# Patient Record
Sex: FEMALE | Race: WHITE | NOT HISPANIC OR LATINO | URBAN - METROPOLITAN AREA
[De-identification: names, ages, dates, MRNs, and addresses within clinical notes are randomized per-mention and may not be internally consistent; named-entity substitution may affect disease eponyms.]

---

## 2024-03-25 ENCOUNTER — OFFICE VISIT (OUTPATIENT)
Dept: OBSTETRICS AND GYNECOLOGY | Facility: CLINIC | Age: 34
End: 2024-03-25
Payer: COMMERCIAL

## 2024-03-25 VITALS
BODY MASS INDEX: 34.16 KG/M2 | SYSTOLIC BLOOD PRESSURE: 100 MMHG | DIASTOLIC BLOOD PRESSURE: 60 MMHG | WEIGHT: 174 LBS | HEIGHT: 60 IN

## 2024-03-25 DIAGNOSIS — Z01.419 WELL WOMAN EXAM WITH ROUTINE GYNECOLOGICAL EXAM: Primary | ICD-10-CM

## 2024-03-25 PROCEDURE — 87624 HPV HI-RISK TYP POOLED RSLT: CPT

## 2024-03-25 PROCEDURE — 88141 CYTOPATH C/V INTERPRET: CPT | Performed by: PATHOLOGY

## 2024-03-25 PROCEDURE — 88175 CYTOPATH C/V AUTO FLUID REDO: CPT

## 2024-03-25 PROCEDURE — 99395 PREV VISIT EST AGE 18-39: CPT | Performed by: OBSTETRICS & GYNECOLOGY

## 2024-03-25 PROCEDURE — 1036F TOBACCO NON-USER: CPT | Performed by: OBSTETRICS & GYNECOLOGY

## 2024-03-25 RX ORDER — ALBUTEROL SULFATE 0.83 MG/ML
SOLUTION RESPIRATORY (INHALATION)
COMMUNITY
Start: 2024-01-20

## 2024-03-25 RX ORDER — SERTRALINE HYDROCHLORIDE 50 MG/1
TABLET, FILM COATED ORAL
COMMUNITY
Start: 2024-03-21

## 2024-03-25 RX ORDER — OMEPRAZOLE 20 MG/1
1 CAPSULE, DELAYED RELEASE ORAL DAILY
COMMUNITY
Start: 2021-06-21

## 2024-03-25 RX ORDER — TIRZEPATIDE 2.5 MG/.5ML
INJECTION, SOLUTION SUBCUTANEOUS
COMMUNITY
Start: 2023-08-12

## 2024-03-25 RX ORDER — ACETAMINOPHEN 500 MG
TABLET ORAL
COMMUNITY

## 2024-03-25 RX ORDER — INSULIN LISPRO 100 [IU]/ML
INJECTION, SOLUTION INTRAVENOUS; SUBCUTANEOUS
COMMUNITY
Start: 2023-10-12

## 2024-03-25 RX ORDER — LEVOTHYROXINE SODIUM 75 UG/1
TABLET ORAL
COMMUNITY

## 2024-03-25 RX ORDER — GLUCAGON INJECTION, SOLUTION 1 MG/.2ML
INJECTION, SOLUTION SUBCUTANEOUS
COMMUNITY
Start: 2023-08-28

## 2024-03-25 RX ORDER — BUDESONIDE AND FORMOTEROL FUMARATE DIHYDRATE 80; 4.5 UG/1; UG/1
AEROSOL RESPIRATORY (INHALATION)
COMMUNITY
Start: 2020-05-18

## 2024-03-25 RX ORDER — MONTELUKAST SODIUM 10 MG/1
10 TABLET ORAL DAILY
COMMUNITY

## 2024-03-25 NOTE — PROGRESS NOTES
Yamel Medina is a 33 y.o. female who is here for a annual exam.     Patient states her cycles were irregular with 7 days of flow but has skipped a month.  Does sometimes skip cycles.  Will keep track of them.    Sexually active: Abstinence    Regular self breast exam: yes  no concerns on her exams    Menstrual History:  OB History          0    Para   0    Term   0       0    AB   0    Living   0         SAB   0    IAB   0    Ectopic   0    Multiple   0    Live Births   0                Patient's last menstrual period was 2024 (approximate).         Review of Systems  All Normal Review of Systems  Constitutional: no fever, no chills, no recent weight gain, no recent weight loss and no fatigue.    Gastrointestinal: no abdominal pain, no constipation, no nausea, no diarrhea and no vomiting.    Genitourinary: no dysuria, no urinary incontinence, no vaginal dryness, no vaginal itching, no dyspareunia, no pelvic pain, no dysmenorrhea, no sexual problems, no change in urinary frequency, no vaginal discharge, no unexplained vaginal bleeding and no lesion/sore.    Breasts: No masses.  No nipple discharge.  No redness    Objective   /60   Ht 1.524 m (5')   Wt 78.9 kg (174 lb)   LMP 2024 (Approximate)   BMI 33.98 kg/m²     OBGyn Exam   Physical Exam  Constitutional: Alert and in no acute distress. Well developed, well nourished.   Head and Face: Head and face: Normal.    Eyes: Normal external exam - nonicteric sclera, extraocular movements intact (EOMI) and no ptosis.   Ears, Nose, Mouth, and Throat: External inspection of ears and nose: Normal.    Neck: No neck asymmetry. Supple. Thyroid not enlarged and there were no palpable thyroid nodules.   Chest: Breasts: Normal appearance, no nipple discharge and no skin changes. Palpation of breasts and axillae: No palpable mass and no axillary lymphadenopathy.   Abdomen: Soft nontender; no abdominal mass palpated. No organomegaly. No hernias.      Genitourinary:   External genitalia: Normal. No inguinal lymphadenopathy. Bartholin's Urethral and Skenes Glands: Normal. Urethra: Normal.    Bladder: Normal on palpation.   Vagina: Normal. No discharge  Cervix: Normal.    Uterus: Normal.    Right Adnexa/parametria: Normal.  Left Adnexa/parametria: Normal.    Inspection of Perianal Area: Normal.     Musculoskeletal: No joint swelling seen, normal movements of all extremities.   Skin: Normal skin color and pigmentation, normal skin turgor, and no rash.   Neurologic: Non-focal. Grossly intact.   Psychiatric: Alert and oriented x 3. Affect normal to patient baseline. Mood: Appropriate.      Assessment/Plan   1) Annual exam:  Normal exam today  Pap with HPV testing completed    Thank you again for your visit to our office today  Please follow-up as needed or in 1 year with your annual exam

## 2024-04-12 ENCOUNTER — TELEPHONE (OUTPATIENT)
Dept: OBSTETRICS AND GYNECOLOGY | Facility: CLINIC | Age: 34
End: 2024-04-12
Payer: COMMERCIAL

## 2024-04-12 NOTE — TELEPHONE ENCOUNTER
Left voicemail to call and set up an appointment for office EMB (Endometrial biopsy).     Radha Lloyd MA

## 2024-04-12 NOTE — TELEPHONE ENCOUNTER
----- Message from Kathleen Daugherty MD sent at 4/11/2024  7:29 AM EDT -----  EMB  ----- Message -----  From: Lab, Background User  Sent: 4/10/2024  10:32 AM EDT  To: Kathleen Daugherty MD

## 2024-05-09 ENCOUNTER — PROCEDURE VISIT (OUTPATIENT)
Dept: OBSTETRICS AND GYNECOLOGY | Facility: CLINIC | Age: 34
End: 2024-05-09
Payer: COMMERCIAL

## 2024-05-09 VITALS
BODY MASS INDEX: 29.16 KG/M2 | SYSTOLIC BLOOD PRESSURE: 110 MMHG | DIASTOLIC BLOOD PRESSURE: 72 MMHG | HEIGHT: 65 IN | WEIGHT: 175 LBS

## 2024-05-09 DIAGNOSIS — Z32.02 ENCOUNTER FOR PREGNANCY TEST WITH RESULT NEGATIVE: ICD-10-CM

## 2024-08-02 ENCOUNTER — APPOINTMENT (OUTPATIENT)
Dept: OBSTETRICS AND GYNECOLOGY | Facility: CLINIC | Age: 34
End: 2024-08-02
Payer: COMMERCIAL

## 2024-08-02 VITALS
SYSTOLIC BLOOD PRESSURE: 126 MMHG | BODY MASS INDEX: 32.98 KG/M2 | DIASTOLIC BLOOD PRESSURE: 60 MMHG | WEIGHT: 168 LBS | HEIGHT: 60 IN

## 2024-08-02 DIAGNOSIS — Z31.69 INFERTILITY COUNSELING: Primary | ICD-10-CM

## 2024-08-02 PROCEDURE — 1036F TOBACCO NON-USER: CPT | Performed by: OBSTETRICS & GYNECOLOGY

## 2024-08-02 PROCEDURE — 99213 OFFICE O/P EST LOW 20 MIN: CPT | Performed by: OBSTETRICS & GYNECOLOGY

## 2024-08-02 PROCEDURE — 3008F BODY MASS INDEX DOCD: CPT | Performed by: OBSTETRICS & GYNECOLOGY

## 2024-08-02 NOTE — PROGRESS NOTES
Subjective   Patient ID: Yamel Medina is a 34 y.o. female who presents for Consult (Surrogacy (Not trying to carry) ).    Patient is a G0    Would like to use a surrogate for fertility  Is single and looking for surrogate as well as donor sperm      ROS  All Normal Review of Systems  Constitutional: no fever, no chills, no recent weight gain, no recent weight loss and no fatigue.    Gastrointestinal: no abdominal pain, no constipation, no nausea, no diarrhea and no vomiting.    Genitourinary: no dysuria, no urinary incontinence, no vaginal dryness, no vaginal itching, no dyspareunia, no pelvic pain, no dysmenorrhea, no sexual problems, no change in urinary frequency, no vaginal discharge, no unexplained vaginal bleeding and no lesion/sore.    Breasts: No masses.  No nipple discharge.  No redness    Objective   /60   Ht 1.524 m (5')   Wt 76.2 kg (168 lb)   LMP 07/31/2024   BMI 32.81 kg/m²        Assessment/Plan   1) looking for fertility assistance with surrogate  Will refer to infertility for process      Thank you for your visit to our office today.

## 2024-12-12 ENCOUNTER — CONSULT (OUTPATIENT)
Dept: ENDOCRINOLOGY | Facility: CLINIC | Age: 34
End: 2024-12-12
Payer: COMMERCIAL

## 2024-12-12 ENCOUNTER — ANCILLARY PROCEDURE (OUTPATIENT)
Dept: ENDOCRINOLOGY | Facility: CLINIC | Age: 34
End: 2024-12-12
Payer: COMMERCIAL

## 2024-12-12 VITALS
DIASTOLIC BLOOD PRESSURE: 90 MMHG | HEIGHT: 60 IN | SYSTOLIC BLOOD PRESSURE: 151 MMHG | HEART RATE: 76 BPM | BODY MASS INDEX: 34.91 KG/M2 | WEIGHT: 177.8 LBS

## 2024-12-12 DIAGNOSIS — Z31.41 FERTILITY TESTING: ICD-10-CM

## 2024-12-12 DIAGNOSIS — R73.03 PRE-DIABETES: Primary | ICD-10-CM

## 2024-12-12 DIAGNOSIS — Z31.69 INFERTILITY COUNSELING: ICD-10-CM

## 2024-12-12 DIAGNOSIS — Z11.3 SCREENING FOR STDS (SEXUALLY TRANSMITTED DISEASES): ICD-10-CM

## 2024-12-12 DIAGNOSIS — Z31.69 PROCREATIVE MANAGEMENT COUNSELING: ICD-10-CM

## 2024-12-12 DIAGNOSIS — Z13.1 SCREENING FOR DIABETES MELLITUS: ICD-10-CM

## 2024-12-12 DIAGNOSIS — Z01.83 ENCOUNTER FOR RH BLOOD TYPING: ICD-10-CM

## 2024-12-12 DIAGNOSIS — E10.9 TYPE 1 DIABETES MELLITUS WITHOUT COMPLICATION: ICD-10-CM

## 2024-12-12 DIAGNOSIS — Z13.29 SCREENING FOR THYROID DISORDER: ICD-10-CM

## 2024-12-12 DIAGNOSIS — Z11.59 ENCOUNTER FOR SCREENING FOR OTHER VIRAL DISEASES: ICD-10-CM

## 2024-12-12 DIAGNOSIS — Z13.71 SCREENING FOR GENETIC DISEASE CARRIER STATUS: ICD-10-CM

## 2024-12-12 LAB
ABO GROUP (TYPE) IN BLOOD: NORMAL
ANTIBODY SCREEN: NORMAL
EST. AVERAGE GLUCOSE BLD GHB EST-MCNC: 174 MG/DL
HBA1C MFR BLD: 7.7 %
HBV SURFACE AG SERPL QL IA: NONREACTIVE
HCV AB SER QL: NONREACTIVE
HIV 1+2 AB+HIV1 P24 AG SERPL QL IA: NONREACTIVE
RH FACTOR (ANTIGEN D): NORMAL
TREPONEMA PALLIDUM IGG+IGM AB [PRESENCE] IN SERUM OR PLASMA BY IMMUNOASSAY: NONREACTIVE
TSH SERPL-ACNC: 2.24 MIU/L (ref 0.44–3.98)

## 2024-12-12 PROCEDURE — 86850 RBC ANTIBODY SCREEN: CPT

## 2024-12-12 PROCEDURE — 87389 HIV-1 AG W/HIV-1&-2 AB AG IA: CPT

## 2024-12-12 PROCEDURE — 86803 HEPATITIS C AB TEST: CPT

## 2024-12-12 PROCEDURE — 99215 OFFICE O/P EST HI 40 MIN: CPT | Performed by: OBSTETRICS & GYNECOLOGY

## 2024-12-12 PROCEDURE — 87491 CHLMYD TRACH DNA AMP PROBE: CPT

## 2024-12-12 PROCEDURE — 99205 OFFICE O/P NEW HI 60 MIN: CPT | Performed by: OBSTETRICS & GYNECOLOGY

## 2024-12-12 PROCEDURE — 84443 ASSAY THYROID STIM HORMONE: CPT

## 2024-12-12 PROCEDURE — 87591 N.GONORRHOEAE DNA AMP PROB: CPT

## 2024-12-12 PROCEDURE — 83516 IMMUNOASSAY NONANTIBODY: CPT

## 2024-12-12 PROCEDURE — 87340 HEPATITIS B SURFACE AG IA: CPT

## 2024-12-12 PROCEDURE — 86900 BLOOD TYPING SEROLOGIC ABO: CPT

## 2024-12-12 PROCEDURE — 86780 TREPONEMA PALLIDUM: CPT

## 2024-12-12 PROCEDURE — 83036 HEMOGLOBIN GLYCOSYLATED A1C: CPT

## 2024-12-12 PROCEDURE — 86901 BLOOD TYPING SEROLOGIC RH(D): CPT

## 2024-12-12 ASSESSMENT — PAIN SCALES - GENERAL: PAINLEVEL_OUTOF10: 0-NO PAIN

## 2024-12-12 NOTE — PROGRESS NOTES
Visit Type: In Person    NEW FERTILITY PATIENT VISIT    Referred by: Dr. Daugherty   Accompanied today by:  Mother       Yamel Medina is a 34 y.o.  female who presents with   Other: Single, desires fertility, would like to consider gestational carrier    Single- no current relationship  Lives on her own- owns house in [place]    Works as Hospice Social Worker    Has multiple medical co-morbidities including DM1, scoliosis with chronic back pain, asthma, depression , hypothyroidism  -Diagnosed with DM1 at age 7 and has been insulin-dependent her whole life     PCOS diagnosed based on irregular menses (now regular on Ozempic) and elevated testosterone/clinical hirsutism    PRIOR EVALUATION / TREATMENT  See below    Hysterosalpingogram: None  Saline Infused Sonography: None    GYN Pelvic Ultrasound:  (ordered for irregular bleeding)  RESULT:     Uterus size: 7.8 x 4.6 x 3.5 cm        -Orientation: Retroverted        -Myometrium: Normal sonographic appearance.        -Endometrial echo complex: 0.6 cm        -Cervix: Nabothian cysts noted within the cervix.     Right ovary: 3.1 x 3.4 x 2.3 cm    Normal sonographic appearance with physiologic follicles.     Left ovary: 3.1 x 3.6 x 2.0 cm    Normal sonographic appearance with physiologic follicles.     Pelvis free fluid: Trace amount of free fluid present within the   posterior cul-de-sac and adjacent to the left ovary.     Other:  CT scan  demonstrated cholecystis, otherwise normal  Prior Labs   Latest Reference Range & Units Most Recent   FOLLICLE STIMULATING HORMONE IU/L 5.5  19 15:50   Hemoglobin A1C 4.3 - 5.6 % 7.8 (H) (E)  24 09:07   LH IU/L 9.9  19 15:50   Thyroid Stimulating Hormone 0.44 - 3.98 mIU/L 1.50  21 15:55   17-Hydroxyprogesterone ng/dL 70  19 15:50   DHEA Sulfate 65 - 395 ug/dL 52 (L)  19 15:50   Testosterone, Free 0.1 - 6.4 pg/mL 8.6 (H)  19 15:50   Estimated Average Glucose mg/dL 177 (E)  24 09:07    Testosterone, Total, LC-MS/MS 2 - 45 ng/dL 75 (H)  19 15:50   (H): Data is abnormally high  (L): Data is abnormally low  (E): External lab result     Relationship Status: single    Have you ever been pregnant? No     OB Hx     OB History          0    Para   0    Term   0       0    AB   0    Living   0         SAB   0    IAB   0    Ectopic   0    Multiple   0    Live Births   0                 GYN HISTORY    History of STD or PID: No  LMP: Patient's last menstrual period was 2024.  Last pap smear:   Lab Results   Component Value Date    FINALINTERP  2024     Squamous and/or Glandular Abnormality    A. THINPREP PAP CERVIX, SCREENING -     Specimen Adequacy  Satisfactory for evaluation; endocervical/transformation zone component is present    General Categorization  Epithelial cell abnormality- glandular cell, see interpretation.    Descriptive Interpretation  Atypical glandular cells             History of abnormal paps: No  History of abnormal mammogram: No  Date of last Mammogram :  N/A  Coitus: None currently    Pain with intercourse, bowel movements or full bladder: No     Pelvic pain: No    MENSTRUAL HISTORY:   Menarche:  14  Contraception:  Has been on OCP but not for about 10 years  Cycle length:  Q 30 days  -periods  now regular on GLP1 agonist  -irregular prior  Bleeding length: 4 days   Flow :  Light   Dysmenorrhea: No     ENDOCRINE HISTORY  Nipple Discharge: No  Vision changes: No  Headaches: No  Excess hair growth: Yes  Acne: Yes  Oily skin:Yes  Recent weight change: No  Significant exercise history: Yes  -1 hour daily  History of eating disorder: No    PMH  No past medical history on file.  DM1- has also developed insulin resistance- on Ozempic and Humalog  Asthma- Albuterol  Hypothryoidism- on synthroid  Scoliosis  Depression- Zoloft    MEDICATIONS    Current Outpatient Medications on File Prior to Visit   Medication Sig Dispense Refill    albuterol 2.5 mg /3 mL (0.083  %) nebulizer solution USE 3 ML VIA NEBULIZER THREE TIMES DAILY AS NEEDED. INHALE OVER 5-15 MINUTES      budesonide-formoteroL (Symbicort) 80-4.5 mcg/actuation inhaler Inhale.      cholecalciferol (Vitamin D3) 50 mcg (2,000 unit) capsule Take by mouth.      Gvoke HypoPen 2-Pack 1 mg/0.2 mL auto-injector       HumaLOG U-100 Insulin 100 unit/mL injection INJECT 120 UNITS UNDER THE SKIN VIA PUMP AS DIRECTED      INSULIN LISPRO SUBQ Inject under the skin.      levothyroxine (Synthroid, Levoxyl) 75 mcg tablet Take by mouth.      montelukast (Singulair) 10 mg tablet Take 1 tablet (10 mg) by mouth once daily.      omeprazole (PriLOSEC) 20 mg DR capsule Take 1 capsule (20 mg) by mouth once daily.      sertraline (Zoloft) 50 mg tablet        No current facility-administered medications on file prior to visit.       PSH  No past surgical history on file.   C cholecystectomy 2023    PSYCH HISTORY  Past psych history: Yes Depression  Prior hospitalization for mental health disorder: No     SOCIAL HISTORY  Occupation: Hospice social work  Social History     Tobacco Use    Smoking status: Never    Smokeless tobacco: Never   Vaping Use    Vaping status: Never Used   Substance Use Topics    Alcohol use: Yes     Comment: Rare    Drug use: Never     History of incarceration: No  History of domestic violence: No  History of  incest or rape: No     PARTNER HISTORY    Partner: No     FAMILY HISTORY   Family History   Problem Relation Name Age of Onset    No Known Problems Mother      No Known Problems Father     -Crohn's and IBS in the family     Family History of Blood Clots: No  Family history of breast, ovary, colon, endometrial cancer: Yes - Pancreatic cancer (maternal grandfather)    GENETIC HISTORY  Ethnic background patient: [employer]  Ethnic background partner: N/A  Genetic Disease in Family: No  Birth Defects in Family: No  Genetic screening performed previously: No     BMI:   BMI Readings from Last 1 Encounters:    24 34.72 kg/m²     VITALS:  /90   Pulse 76   Ht 1.524 m (5')   Wt 80.6 kg (177 lb 12.8 oz)   LMP 2024   BMI 34.72 kg/m²   LMP: Patient's last menstrual period was 2024.    ASSESSMENT   34 y.o.  female single, desires fertility, does not want to carry pregnancy due to multiple medical co-morbidites. Suspected oligoovulation (currently regulated on Ozempic) and the following pertinent medical issues: DM1, scoliosis with chronic back pain, asthma, depression , hypothyroidism .  Partner SA: NA    COUNSELING    We discussed the options for donor sperm, including using a directed vs. unidentified donor; we discussed the pros and cons of each option.  We discussed that for unidentified donors that offspring may be able to identify the donor and/or other offspring from the same donor in the future. We discussed the costs and logistics of obtaining the sperm and the use of a sperm bank.  We discussed options for conceiving including natural cycle IUI, medicated IUI, and IVF.  We discussed natural fecundity and the need for fertility testing.  We discussed the Fertility Center policy to obtain donor clearance prior to ordering and shipping sperm. Donor sperm must be on site prior to treatment cycle start.    Discussed that if patient desires gestational carrier, would need further assessment to determine qualifications for our gestational carrier program.  This fertility option would require IVF.  Discussed IVF as option and briefly reviewed what IVF involves (i.e. use of fertility drugs, egg retrieval, embryo transfer) and discussed IVF is treatment as well as diagnostic testing.      Routine Testing  Fertility Center  STDs Within 1 year   Genetic carrier Waiver/Completed   T&S Within 1 year   AMH Within 1 year   TSH Within 1 year   Rubella/Varicella Within 5 years     BMI Testing  Fertility Center  CBC Within 1 year   CMP Within 1 year   HgbA1c Within 1 year   Mag, Phos, Vit D <18  Within 1 year   MFM > 40  REQ   Wt loss consult > 40 OPT     PLAN  Orders Placed This Encounter   Procedures    US pelvis transvaginal    Antimullerian Hormone (Amh)    TSH with reflex to Free T4 if abnormal    Type And Screen    Hepatitis B surface antigen    Hepatitis C Antibody    HIV-1 and HIV-2 antibodies    Syphilis Screen with Reflex    C. Trachomatis / N. Gonorrhoeae, Amplified Detection    Hemoglobin A1C    Referral to Maternal Fetal Medicine    Referral to Ob-Gyn Behavioral Health       GENETIC SCREENING PATIENT  Will send patient to genetic counselor to review screening options    PARTNER  N/A    FOLLOW UP   Consults: MFM consult: indication:Discuss risks of pregnancy, Genetic consult: indicationgenetic screening options, Behavior Medicine consult, and Financial consult  Chart to primary nurse for care coordination and patient check list/education  Enroll in Engaged MD  Take prenatal vitamins, vitamin D 2000 IUs daily  Discussed that pap and mammogram must be updated per ACOG guidelines before treatment can begin  Discussed that treatment cannot proceed until checklist items are complete   6 week follow up with MD- FOR IVF consult (3rd party)  Additional testing for BMI < 18 or > 40: NA  Sperm Donor:  Information given on facilities    MD Completion:  Ectopic Risk: No  Medically Complex: Yes See above     Fertility Plan Update:  Testing as above  Referrals as above- 3rd party psych, MFM, and genetics  3. Will begin to look at donor sperm options  4. Follow up with me for IVF consult- donor sperm and future gestational carrier     Abbi Clay  12/12/2024  10:47 AM

## 2024-12-12 NOTE — PATIENT INSTRUCTIONS
DONOR SPERM FACILITIES*     For your convenience, below is a list of commonly used donor sperm facilities by patients at  Fertility Center.   The facilities listed below adhere to CLIA guidelines and are FDA approved for .     [facility] ([facility])   [facility])   [facility]   [facility]   [facility]   [facility]   [facility]     If the facility you wish to use is not listed, you may request a review by the  and third party manager. Approval is not guaranteed and is granted on a case-by-case basis.   The donor of your choice will be reviewed by our third party team. Upon approval, please confirm with your clinical team before shipping. Samples and/or donors that have not been approved may not be accepted.        * Please note that [facility] has no affiliation or agency relationship with any of the donor facilities listed above, and it disclaims any and all responsibility for tissue or other property transferred from such storage facilities.     Gestational carrier  [facility]      Ash Medina,    We have placed a referral for you to see maternal fetal medicine as a part of your next steps moving forward in your fertility treatment. You can call [phone] to schedule your appointment.     Your  Fertility Team     Please call our genetic counselors to schedule a visit to discuss your genetic carrier screening options:  [phone]       ASSESSMENT   34 y.o.  female single, desires fertility, does not want to carry pregnancy due to multiple medical co-morbidites. Suspected oligoovulation (currently regulated on Ozempic) and the following pertinent medical issues: DM1, scoliosis with chronic back pain, asthma, depression , hypothyroidism .  Partner SA: NA    COUNSELING    We discussed the options for donor sperm,  including using a directed vs. unidentified donor; we discussed the pros and cons of each option.  We discussed that for unidentified donors that offspring may be able to identify the donor and/or other offspring from the same donor in the future. We discussed the costs and logistics of obtaining the sperm and the use of a sperm bank.  We discussed options for conceiving including natural cycle IUI, medicated IUI, and IVF.  We discussed natural fecundity and the need for fertility testing.  We discussed the Fertility Center policy to obtain donor clearance prior to ordering and shipping sperm. Donor sperm must be on site prior to treatment cycle start.    Discussed that if patient desires gestational carrier, would need further assessment to determine qualifications for our gestational carrier program.  This fertility option would require IVF.  Discussed IVF as option and briefly reviewed what IVF involves (i.e. use of fertility drugs, egg retrieval, embryo transfer) and discussed IVF is treatment as well as diagnostic testing.      Routine Testing  Fertility Center  STDs Within 1 year   Genetic carrier Waiver/Completed   T&S Within 1 year   AMH Within 1 year   TSH Within 1 year   Rubella/Varicella Within 5 years     BMI Testing [facility]  CBC Within 1 year   CMP Within 1 year   HgbA1c Within 1 year   Mag, Phos, Vit D <18 Within 1 year   MFM > 40  REQ   Wt loss consult > 40 OPT     PLAN  Orders Placed This Encounter   Procedures    US pelvis transvaginal    Antimullerian Hormone (Amh)    TSH with reflex to Free T4 if abnormal    Type And Screen    Hepatitis B surface antigen    Hepatitis C Antibody    HIV-1 and HIV-2 antibodies    Syphilis Screen with Reflex    C. Trachomatis / N. Gonorrhoeae, Amplified Detection    Hemoglobin A1C    Referral to Maternal Fetal Medicine    Referral to Ob-Gyn Behavioral Health       GENETIC SCREENING PATIENT  Will send patient to genetic counselor to review screening  options    PARTNER  N/A    FOLLOW UP   Consults: MFM consult: indication:Discuss risks of pregnancy, Genetic consult: indicationgenetic screening options, Behavior Medicine consult, and Financial consult  Chart to primary nurse for care coordination and patient check list/education  Enroll in Engaged MD  Take prenatal vitamins, vitamin D 2000 IUs daily  Discussed that pap and mammogram must be updated per ACOG guidelines before treatment can begin  Discussed that treatment cannot proceed until checklist items are complete   6 week follow up with MD- FOR IVF consult (3rd party)  Additional testing for BMI < 18 or > 40: NA  Sperm Donor:  Information given on facilities    MD Completion:  Ectopic Risk: No  Medically Complex: Yes See above     Fertility Plan Update:  Testing as above  Referrals as above- 3rd party psych, MFM, and genetics  3. Will begin to look at donor sperm options  4. Follow up with me for IVF consult- donor sperm and future gestational carrier     Abbi Clay  12/12/2024  10:47 AM

## 2024-12-13 LAB
C TRACH RRNA SPEC QL NAA+PROBE: NEGATIVE
N GONORRHOEA DNA SPEC QL PROBE+SIG AMP: NEGATIVE

## 2024-12-15 LAB — MIS SERPL-MCNC: 13.01 NG/ML (ref 0.18–11.71)

## 2024-12-16 ENCOUNTER — ANCILLARY PROCEDURE (OUTPATIENT)
Dept: ENDOCRINOLOGY | Facility: CLINIC | Age: 34
End: 2024-12-16
Payer: COMMERCIAL

## 2024-12-16 DIAGNOSIS — Z31.41 FERTILITY TESTING: ICD-10-CM

## 2024-12-16 PROCEDURE — 76830 TRANSVAGINAL US NON-OB: CPT

## 2024-12-16 PROCEDURE — 76830 TRANSVAGINAL US NON-OB: CPT | Performed by: OBSTETRICS & GYNECOLOGY

## 2024-12-18 DIAGNOSIS — Z31.83 ENCOUNTER FOR ASSISTED REPRODUCTIVE FERTILITY CYCLE: ICD-10-CM

## 2024-12-18 NOTE — PROGRESS NOTES
"Boarding Pass Intended Parent for Future Gestational Carrier (IPGC) Checklist    Age: 35 y.o.    Provider: Abbi Clay MD  Primary RN: Rosanne Rivera  Reasons for Treatment: Indication for Use of Gestational Carrier multiple medical co-morbidities including DM1, scoliosis with chronic back pain, asthma, depression , hypothyroidism  -Diagnosed with DM1 at age 7 and has been insulin-dependent her whole life     Gestational Carrier: None  Last BMI  25 : 32.67 kg/m²       Past Medical History:   Diagnosis Date    Asthma     Depression     Diabetes mellitus type 1 (Multi)     Hypothyroidism     Polycystic ovary syndrome     Scoliosis      **recommend FUV prior to FET    Date Done Consultation Results/Comments   24 Medication Protocol Lead in: OCP  Fertility Plan Update: Antagonist , HMG 75  Trigger plan: Lupron  Pre-retrieval meds: Antibiotics per protocol  Adjuncts: none  Notes: freezing for GC (will likely carry herself but still want to make GC eligible)    Stop Ozempic with IVF stim start, can restart after retrieval and stop again prior to FET.    24 IVF Consult IVF Consult: Yes  PGT-A/M? Yes PGT-A -- order to be sent by [facility] once on 24 Consult r/t 3rd party Yes   25  Waiver (Out) Form Enroll in EngagedMD: Yes (Rosanne Rivera RN)  Received and in chart: Yes (Rosanne Rivera RN)   25 IVF Information and Authorization (to be completed annually) Received and in chart: Yes (Rosanne Rivera RN)   25 [employer] Packet [x]     7/10/25 [facility] Donor Sperm Packet (ID REQ) (Each time sperm is ordered) [x]     7/10/25 [employer] Gamete/Tissue- Single (ADULT) Packet- Patient (Each time sperm is ordered) [x]     25 Procedure Order Placed  [x]     1/23/24  3/5/25 Psych Consult Okay to proceed? Yes  (Will need future group visit with GC)    Legal For future carrier   3/20/25 Genetics Consult Okay to proceed? Yes    \"Top 3 options for anonymous sperm donor " "were previously reviewed at genetic counseling appointment on 03/20/2025:  [facility] donor [id number] (445-gene [facility] carrier screen was negative). CMV negative.  Donor was screened for congenital adrenal hyperplasia and was negative (personalized residual risk not included on test report but expected to be <1%).   Due to risk-reducing carrier screening, this donor is APPROVED.  DISPOSITION:  Yamel Medina expressed understanding of the above information. Donors [id number] ...from [facility] are approved from a genetic standpoint. Patient is aware that she can send additional donor profiles if she would like other donors approved.  Hodan Kirby, [facility] at 4/11/2025  2:36 PM     *elected to use donor [id number]    Patient POS carrier for Congenital adrenal hyperplasia      Other Saw MFM (Dr. Whiting)    \"In summary, I see no overt contraindications to assisted reproduction but would recommend aggressive attempt at glycemic control prior to carrying a pregnancy with goal A1c< 7%, ideally < 6.5%. \"    Reviewed Scoliosis  \"Reviewed that scoliosis can generally be managed with supportive care and non-invasive treatments in pregnancy like massage, PT, acupuncture. \"       Date Done Female Labs Results/Comments    [facility] Will do viromeds at baseline, baseline STD panel done 12/12/24 WNL   12/12/2024 T&S (Q 1 Year) ABO: B  Rh: POS  Antibody: NEG   12/12/2024 AMH 13.010 (H)   12/12/2024 TSH 2.24 (Ref range: 0.44 - 3.98 mIU/L)   2025 Carrier Screening Myriad 2bP: ordered through genetics, 113 gene panel  Pos Congenital adrenal hyperplasia     3/23/2023  3/27/2025 Pap Smear Lab Results   Component Value Date    FINALINTERP  03/27/2025         A. THINPREP PAP CERVIX, SCREENING -     Specimen Adequacy  Satisfactory for evaluation; endocervical/transformation zone component is present    General Categorization  Negative for intraepithelial lesion or malignancy.    Descriptive Interpretation  Negative for " intraepithelial lesion or malignancy            [id number]  03/23/2023     A.  THINPREP PAP CERVICAL:              Specimen adequacy:       SATISFACTORY FOR EVALUATION.       Quality Indicator: Endocervical/transformation zone component is present.              General Categorization:       NEGATIVE FOR INTRAEPITHELIAL LESION OR MALIGNANCY.                            HIGH RISK HPV TEST RESULT:                       HPV GENOTYPE  16                      NEGATIVE       HPV GENOTYPE  18                      NEGATIVE       HPV GENOTYPE  OTHER             NEGATIVE              Reference Range: Negative                     HPV: Negative   N/A Mammogram N/A   5/30/25 Hysteroscopy Findings: endometrial polyp(s) noted near left cornua and bilateral ostia well visualized, otherwise normal appearance of cavity.     -s/p hysteroscopy polypectomy 5/30/2025 2025 FDA Workup FDA Physical: completed; Date: 6/26/25 (within 6 mo)  FDA Questionnaire: completed; Date: 7/10/25 (within 6 mo)  Viromeds: to be completed at baseline  Summary of Records: to be signed off once viromeds are resulted after baseline    Donor Number Add to spreadsheet [id number]  Egg Bank: N/A  Agency: future carrier   Date Done Male Labs (skip if using sperm donor)   Results/Comments  DONOR SPERM      Donor Type Unidentifiable   2025 Donor Cleared? [x]     [facility] donor [id number]     CMV STATUS: NEGATIVE  Genetic clearance as above    Known Donor FDA Eligible: N/A  FDA Waiver: N/A    [facility]        MD Completion:  Ectopic Risk: No  Medically Complex: Yes    Reviewed and okay to proceed  Abbi Clay 07/14/25 11:45 AM

## 2025-01-23 ENCOUNTER — APPOINTMENT (OUTPATIENT)
Dept: BEHAVIORAL HEALTH | Facility: CLINIC | Age: 35
End: 2025-01-23
Payer: COMMERCIAL

## 2025-01-23 DIAGNOSIS — E28.2 PCOS (POLYCYSTIC OVARIAN SYNDROME): ICD-10-CM

## 2025-01-23 DIAGNOSIS — F54 PSYCHOLOGICAL FACTORS AFFECTING MEDICAL CONDITION: Primary | ICD-10-CM

## 2025-01-23 DIAGNOSIS — Z31.69 INFERTILITY COUNSELING: ICD-10-CM

## 2025-01-23 PROCEDURE — 90791 PSYCH DIAGNOSTIC EVALUATION: CPT | Performed by: PSYCHOLOGIST

## 2025-02-04 ENCOUNTER — CONSULT (OUTPATIENT)
Dept: ENDOCRINOLOGY | Facility: CLINIC | Age: 35
End: 2025-02-04
Payer: COMMERCIAL

## 2025-02-04 VITALS
DIASTOLIC BLOOD PRESSURE: 96 MMHG | BODY MASS INDEX: 34.89 KG/M2 | TEMPERATURE: 98.2 F | OXYGEN SATURATION: 99 % | HEIGHT: 60 IN | RESPIRATION RATE: 17 BRPM | SYSTOLIC BLOOD PRESSURE: 138 MMHG | WEIGHT: 177.7 LBS

## 2025-02-04 DIAGNOSIS — Z31.69 PROCREATIVE MANAGEMENT COUNSELING: Primary | ICD-10-CM

## 2025-02-04 DIAGNOSIS — E28.2 PCOS (POLYCYSTIC OVARIAN SYNDROME): ICD-10-CM

## 2025-02-04 PROBLEM — E03.9 HYPOTHYROIDISM: Status: ACTIVE | Noted: 2025-02-04

## 2025-02-04 PROBLEM — J45.909 ASTHMA: Status: ACTIVE | Noted: 2025-02-04

## 2025-02-04 PROBLEM — F32.A DEPRESSION: Status: ACTIVE | Noted: 2025-02-04

## 2025-02-04 PROBLEM — R73.03 PRE-DIABETES: Status: ACTIVE | Noted: 2025-02-04

## 2025-02-04 PROBLEM — M41.9 SCOLIOSIS: Status: ACTIVE | Noted: 2025-02-04

## 2025-02-04 PROBLEM — E10.9 DIABETES MELLITUS TYPE 1 (MULTI): Status: ACTIVE | Noted: 2025-02-04

## 2025-02-04 PROCEDURE — 99215 OFFICE O/P EST HI 40 MIN: CPT | Performed by: OBSTETRICS & GYNECOLOGY

## 2025-02-04 PROCEDURE — 99417 PROLNG OP E/M EACH 15 MIN: CPT | Performed by: OBSTETRICS & GYNECOLOGY

## 2025-02-04 RX ORDER — AZELASTINE 1 MG/ML
1 SPRAY, METERED NASAL 2 TIMES DAILY
COMMUNITY
Start: 2024-12-18

## 2025-02-04 ASSESSMENT — COLUMBIA-SUICIDE SEVERITY RATING SCALE - C-SSRS
2. HAVE YOU ACTUALLY HAD ANY THOUGHTS OF KILLING YOURSELF?: NO
6. HAVE YOU EVER DONE ANYTHING, STARTED TO DO ANYTHING, OR PREPARED TO DO ANYTHING TO END YOUR LIFE?: NO
1. IN THE PAST MONTH, HAVE YOU WISHED YOU WERE DEAD OR WISHED YOU COULD GO TO SLEEP AND NOT WAKE UP?: NO

## 2025-02-04 ASSESSMENT — PAIN SCALES - GENERAL: PAINLEVEL_OUTOF10: 0-NO PAIN

## 2025-02-04 ASSESSMENT — PATIENT HEALTH QUESTIONNAIRE - PHQ9
SUM OF ALL RESPONSES TO PHQ9 QUESTIONS 1 AND 2: 0
1. LITTLE INTEREST OR PLEASURE IN DOING THINGS: NOT AT ALL
2. FEELING DOWN, DEPRESSED OR HOPELESS: NOT AT ALL

## 2025-02-04 ASSESSMENT — LIFESTYLE VARIABLES: CURRENT_SMOKER: NO

## 2025-02-04 NOTE — PATIENT INSTRUCTIONS
Please discuss the following with your endocrinologist tomorrow:  Risks of pregnancy with Type 1 DM and elevated HgA1C- please ask him to document this  Ozempic and insulin plan for stimulation and egg retrieval with MAC anesthesia (propofol, no intubation), including periods of NPO- please ask  him to document this    Third party- : Dee Aleman,    Listed below are the guillory or agencies we work with [facility]:    [facility] Donor [facility]gencies  [facility]  Donor  (matching system)  [facility]       Thank you,  Your Fertility Center Team      ASSESSMENT    34 y.o.  desires to proceed with IVF Freeze All for Future Gestational Carrier.     Discussed use of Gestational carrier for pregnancy. Discussed family/friend vs Agency Carrier. Discussed ethical, legal, and financial considerations. Discussed timeline and logistics of selecting a GC, FET process, and Pregnancy considerations. Discussed need for independent . Briefly discussed state law r/t GC pregnancies. Discussed need for medical insurance policy for GC pregnancy and delivery. Discussed need for psychology consult with  psychologist prior to creation of embryos and again with identified GC. Discussed FDA guidance for Eligibility Determination for Donors of Human Cells, Tissues, and Cellular and Tissue-Based Products. Discussed that testing is required prior to cryopreservation of gametes and/or creation of embryos. Discussed that if embryos are determined to be ineligible future gestational carrier would need to sign FDA waiver where they are counseled on communicable disease risk.     We reviewed IVF and discussed the following:    In-vitro fertilization and embryo transfer  Stimulation Protocols  Oocyte retrieval, risks   Cryopreservation  Assessment of fertilization   Embryo cleavage  Statistics  ICSI/ Assisted hatching  Embryo transfer and preparation for future GC  Risks of OHSS and  multiple gestation  Dropped cycles  Use of birth control  Selective reduction  Number of embryos to transfer  Ectopic pregnancy  Team of physicians  Informed consent procedure  Folic acid supplementation  Genetic screening  PGT-A/PGT-M  Gestational Carrier Agencies  Need for Independent   Psychology Consult (3rd party)    ART Cycle Plan    1. Protocol/Fertility Plan Update:   Lead in: OCP  Stimulation protocol: Antagonist /Menopur 75  Trigger plan: Lupron  Pre-retrieval meds: Antibiotics per protocol  Adjuncts: None  Notes:     2. Insemination:  Sperm source: donor  Sperm collection method: Frozen  Notes:  ICSI: Yes  # of oocytes to be fertilized: all    3. Cryopreservation plan  PGT: PGTA   Freeze all? Yes  Oocyte cryopreservation: No    4. Number of embryos to replace to Gestational Carrier: 1  Stage of embryo transfer: day 5    5. Patient willing to accept blood transfusion: No    6. RN to review chart, initiate IP using Gestational Carrier boarding pass, and assure completion of the following prior to proceeding with IVF stimulation:       Orders Placed This Encounter   Procedures    Referral to Genetics    Referral to Ob-Gyn Behavioral Health       Baseline set of STI screening for patient and partner to be completed within the past year. If not, obtain at time of consult.   FDA Viromed screening: For oocyte source completed within 30 days of oocyte retrieval. For sperm source completed within 7 days of sperm collection.  FDA physical exam: ensure completed within 6 months of donation for oocyte and sperm source.   FDA questionnaire: ensure completed within 6 months of donation or repeated if donation since last completion for oocyte and sperm source.  Genetic carrier testing: waiver or carrier screen completed with clearance documentation by provider for both patient and partner (z13.71)  Type & Screen to be completed within the last year (z01.83)  AMH to be completed within the last year  (z31.41)  Frozen sperm sample: ensure frozen IP sample (z31.41) or verify donor sperm on site prior to stimulation start date.  Verify in EMR or obtain copy of patient’s last mammogram (if applicable) and pap smear results for provider review in boarding pass.  Enroll in Engaged MD and complete annual consent forms for IP using GC consent and cryotransport agreements.  Consults: Third Party Nursing, Financial Consult,  and Third Party Psychology Consult: ensure completed prior to IVF baseline  Additional consults MFM consult, Genetics consult, Behavior Medicine consult, Financial consult, and Specialist clearance Endocrinology- recommendations prior to transfer and review what is in the boarding pass.  Legal contracts to be completed prior to FET of gestational carrier.    Fertility Plan Update:  Recommend MFM consult so patient can make final decision about carrying pregnancy (this is optional if patient feels strongly she cannot carry pregnancy)  Will need psych and genetics consults as above, FDA screening  -Patient reports she had visit with Zack (no notes available), will  need follow up with 3rd party psych visit to discuss donor sperm and gestational carrier  -Recommend she work with endocrinologist on plan during stim/retrieval.  We typically recommend holding Ozempic at start of stim; however, she may want to stay on until retrieval and follow procedure protocol (NPO prior to retrieval, etc)    Intimate Exam Performed: No, an intimate exam was not performed at this encounter.   Was FDA Physical Completed Today? No    Abbi Clay  02/04/2025  9:44 AM

## 2025-02-04 NOTE — PROGRESS NOTES
Visit Type: In Person  N/A    IP using Gestational Carrier     Patient is a 34 y.o.  female with a history of single, desires pregnancy, presenting today to discuss IVF using future gestational carrier.    Here today with: Family member  Indication for IVF: Polycystic Ovarian Syndrome and Indication for Use of Gestational Carrier Medical co-morbidities (DM1, scoliosis with chronic back pain, asthma, depression, hypothyroidism)  -Diagnosed with DM1 at age 7 and has been insulin-dependent her whole life , recent HgA1C 7.7%  -Also with PCOS- PCOS diagnosed based on irregular menses (now regular on Ozempic) and elevated testosterone/clinical hirsutism     AMH: 13.010 (H)  Status of fallopian tubes: Not tested   Past Infertility Treatments: None     Latest Reference Range & Units Most Recent   FOLLICLE STIMULATING HORMONE IU/L 5.5  19 15:50   Hemoglobin A1C See comment % 7.7 (H)  24 11:58   Estimated Average Glucose Not Established mg/dL 174  24 11:58   LH IU/L 9.9  19 15:50   Thyroid Stimulating Hormone 0.44 - 3.98 mIU/L 2.24  24 11:58   17-Hydroxyprogesterone ng/dL 70  19 15:50   DHEA Sulfate 65 - 395 ug/dL 52 (L)  19 15:50   Anti-Mullerian Hormone 0.176 - 11.705 ng/mL 13.010 (H)  24 11:58   Testosterone, Total, LC-MS/MS 2 - 45 ng/dL 75 (H)  19 15:50   Testosterone, Free 0.1 - 6.4 pg/mL 8.6 (H)  19 15:50   (H): Data is abnormally high  (L): Data is abnormally low    GYN HISTORY     STDs: None  HX of abnormal Pap: No  HX of abnormal Mammo: No  LMP: Patient's last menstrual period was 2025 (approximate).  Menstrual cycles: Irregular- now regular on Ozempic  Pap smear: 2024  neg  HPV: Negative    Mammogram:  None     PMH  DM1- has also developed insulin resistance- on Ozempic and Humalog  Asthma- Albuterol  Hypothryoidism- on synthroid 75 mcg  Scoliosis  Depression- Zoloft      History of any clotting: No  History of hospitalizations or surgeries:  Yes  History of easy bleeding/bruising: No    PSH  LSC cholecystectomy 2023     Genetic screening Hx  Patient: Jordin 2bP: None  Sperm Source: Donor  Sperm: Myriad 2bP: Not yet selected  Sperm Source Genetic Carrier Screening:  Not done yet  Not done yet    Social history  Occupation:  Hospice social work   Current smoker: No  Social History     Tobacco Use    Smoking status: Never    Smokeless tobacco: Never   Vaping Use    Vaping status: Never Used   Substance Use Topics    Alcohol use: Yes     Comment: Rare    Drug use: Never       Family history  Cognitive deficits: No  Birth defects: No  Other:     Current Meds  Current Outpatient Medications on File Prior to Visit   Medication Sig Dispense Refill    azelastine (Astelin) 137 mcg (0.1 %) nasal spray Administer 1 spray into each nostril 2 times a day.      albuterol 2.5 mg /3 mL (0.083 %) nebulizer solution USE 3 ML VIA NEBULIZER THREE TIMES DAILY AS NEEDED. INHALE OVER 5-15 MINUTES      budesonide-formoteroL (Symbicort) 80-4.5 mcg/actuation inhaler Inhale.      cholecalciferol (Vitamin D3) 50 mcg (2,000 unit) capsule Take by mouth.      Gvoke HypoPen 2-Pack 1 mg/0.2 mL auto-injector       HumaLOG U-100 Insulin 100 unit/mL injection INJECT 120 UNITS UNDER THE SKIN VIA PUMP AS DIRECTED      INSULIN LISPRO SUBQ Inject under the skin.      levothyroxine (Synthroid, Levoxyl) 75 mcg tablet Take by mouth.      montelukast (Singulair) 10 mg tablet Take 1 tablet (10 mg) by mouth once daily.      omeprazole (PriLOSEC) 20 mg DR capsule Take 1 capsule (20 mg) by mouth once daily.      sertraline (Zoloft) 50 mg tablet        No current facility-administered medications on file prior to visit.       Allergies  Sodium citrate    Partner History  N/A    VITALS:  BP (!) 138/96   Temp 36.8 °C (98.2 °F) (Oral)   Resp 17   Ht 1.524 m (5')   Wt 80.6 kg (177 lb 11.2 oz)   LMP 01/14/2025 (Approximate)   SpO2 99%   BMI 34.70 kg/m²   BMI:   BMI Readings from Last 1 Encounters:    25 34.70 kg/m²     BMI Testing  [facility]ertility Center  CBC Within 1 year   CMP Within 1 year   HgbA1c Within 1 year   Mag, Phos, Vit D <18 Within 1 year   MFM > 40  REQ   Wt loss consult > 40 OPT     ASSESSMENT    34 y.o.  desires to proceed with IVF Freeze All for Future Gestational Carrier.     Discussed use of Gestational carrier for pregnancy. Discussed family/friend vs Agency Carrier. Discussed ethical, legal, and financial considerations. Discussed timeline and logistics of selecting a GC, FET process, and Pregnancy considerations. Discussed need for independent . Briefly discussed state law r/t GC pregnancies. Discussed need for medical insurance policy for GC pregnancy and delivery. Discussed need for psychology consult with  psychologist prior to creation of embryos and again with identified GC. Discussed FDA guidance for Eligibility Determination for Donors of Human Cells, Tissues, and Cellular and Tissue-Based Products. Discussed that testing is required prior to cryopreservation of gametes and/or creation of embryos. Discussed that if embryos are determined to be ineligible future gestational carrier would need to sign FDA waiver where they are counseled on communicable disease risk.     We reviewed IVF and discussed the following:    In-vitro fertilization and embryo transfer  Stimulation Protocols  Oocyte retrieval, risks   Cryopreservation  Assessment of fertilization   Embryo cleavage  Statistics  ICSI/ Assisted hatching  Embryo transfer and preparation for future GC  Risks of OHSS and multiple gestation  Dropped cycles  Use of birth control  Selective reduction  Number of embryos to transfer  Ectopic pregnancy  Team of physicians  Informed consent procedure  Folic acid supplementation  Genetic screening  PGT-A/PGT-M  Gestational Carrier Agencies  Need for Independent   Psychology Consult (3rd party)    ART Cycle Plan    1. Protocol/Fertility Plan Update:    Lead in: OCP  Stimulation protocol: Antagonist /Menopur 75  Trigger plan: Lupron  Pre-retrieval meds: Antibiotics per protocol  Adjuncts:  None  Notes:     2. Insemination:  Sperm source: donor  Sperm collection method: Frozen  Notes:  ICSI: Yes  # of oocytes to be fertilized: all    3. Cryopreservation plan  PGT: PGTA   Freeze all? Yes  Oocyte cryopreservation: No    4. Number of embryos to replace to Gestational Carrier: 1  Stage of embryo transfer: day 5    5. Patient willing to accept blood transfusion: No    6. RN to review chart, initiate IP using Gestational Carrier boarding pass, and assure completion of the following prior to proceeding with IVF stimulation:       Orders Placed This Encounter   Procedures    Referral to Genetics    Referral to Ob-Gyn Behavioral Health       Baseline set of STI screening for patient and partner to be completed within the past year. If not, obtain at time of consult.   FDA Viromed screening: For oocyte source completed within 30 days of oocyte retrieval. For sperm source completed within 7 days of sperm collection.  FDA physical exam: ensure completed within 6 months of donation for oocyte and sperm source.   FDA questionnaire: ensure completed within 6 months of donation or repeated if donation since last completion for oocyte and sperm source.  Genetic carrier testing: waiver or carrier screen completed with clearance documentation by provider for both patient and partner (z13.71)  Type & Screen to be completed within the last year (z01.83)  AMH to be completed within the last year (z31.41)  Frozen sperm sample: ensure frozen IP sample (z31.41) or verify donor sperm on site prior to stimulation start date.  Verify in EMR or obtain copy of patient’s last mammogram (if applicable) and pap smear results for provider review in boarding pass.  Enroll in Engaged MD and complete annual consent forms for IP using GC consent and cryotransport agreements.  Consults: Third  Party Nursing, Financial Consult,  and Third Party Psychology Consult: ensure completed prior to IVF baseline  Additional consults MFM consult, Genetics consult, Behavior Medicine consult, Financial consult, and Specialist clearance Endocrinology- recommendations prior to transfer  and review what is in the boarding pass.  Legal contracts to be completed prior to FET of gestational carrier.    Fertility Plan Update:  Recommend MFM consult so patient can make final decision about carrying pregnancy (this is optional if patient feels strongly she cannot carry pregnancy)  Will need psych and genetics consults as above, FDA screening  -Patient reports she had visit with Zack (no notes available), will  need follow up with 3rd party psych visit to discuss donor sperm and gestational carrier  -Recommend she work with endocrinologist on plan during stim/retrieval.  We typically recommend holding Ozempic at start of stim; however, she may want to stay on until retrieval and follow procedure protocol (NPO prior to retrieval, etc)    Intimate Exam Performed: No, an intimate exam was not performed at this encounter.   Was FDA Physical Completed Today? No    Abbi Clay  02/04/2025  9:44 AM

## 2025-02-13 PROBLEM — F54 PSYCHOLOGICAL FACTORS AFFECTING MEDICAL CONDITION: Status: ACTIVE | Noted: 2025-02-13

## 2025-02-13 NOTE — PROGRESS NOTES
Outpatient Psychology Initial Evaluation    IDENTIFYING AND REFERRAL INFORMATION:  Yamel Medina is a 34 y.o. able-bodied, employed single White or  female patient referred by Dr. Clay for Psych Fertility Consult, a standard pre-treatment consult focused on education, info gathering, and discussion of ethics considering autonomy of any future child conceived with third party involvement (gametes, GC).     Start Time: 1:30 pm  End Time: 2:30 pm  Time Spent with Patient: 54 minutes    Session number 1 with this psychologist.    This is a virtual video visit.    Telephone/Televideo Informed Consent for Psychotherapy was reviewed with the patient. Purpose of the consult was reviewed with patient as well. Understanding and verbal agreement was attested to by the patient.     SECURE NOTE:  This document may not be released or reproduced in any form without the consent of either the Provider, the Provider´s  or the Chair of the Department of Psychiatry. This prohibition includes copying the document into any non-Restricted area of the Ambulatory Electronic Medical Record.      SOCIAL AND FAMILY HISTORY:  B/R in [facility]. Raised by her parents. One older sister living in [place] and going through fertility care with her partner in that state. Described her family as very close. She eats several meals a week with her parents and they have a weekly Sunday brunch. Extended family in the area as well. She said she's been open about pursuing fertility care with her family; shared details with her immediate family and her cousins because she has family members who've been through fertility treatment and some of her relatives are in the medical field. She has two best friends, a close friend group. Some of her friends have older children. Her close friends know she is going through fertility care as well.     HISTORY OF MENTAL HEALTH SYMPTOMS AND TREATMENT:  Any history of mental health treatment?  Reported history of situational depression several years ago. Prescribed Zoloft for years, maintains on it via PCP for last 2+ years.   Any history of trauma or abuse in your background? No  Any history of substance use problems? No    FERTILITY CARE:   Why pursue single parenthood?  Part of a long line of independent women, some of whom have gone through fertility care as singles as well. Decided in high school that she should have a GC if she has a child, because of her diabetes, scoliosis, and other health problems. Her great uncle is an orthopedic surgeon and agreed pregnancy would likely be painful, per patient.     When did you decide to have a child? Or children? Do you have a number of kids you want to have?  Plan was for age 36 to start fertility care, with political climate she decided to start now, at age 34.   Wants to have a healthy child, possibly consider a second child in the future based off her experience going through this process.     Who is planning to carry this intended pregnancy? Gestational carrier; still identifying.  Why? Reasons noted above.     Let's discuss the essential goals of family making. Goal #1 is to pass along your genes, Goal #2 is to be pregnant, and Goal #3 is to parent. How important are each of these goals to you? What is the meaning behind the decisions you've made so far?    She Wants to be a parent first and foremost. She reported she has worked through knowledge the other two goals may not be possible and accepts that. She's been aware of it for years. Even willing to do adoption if none of the fertility care options are possible.     Gamete donor decision making:   Planning on donor sperm.   Wants to also use her own egg, but that is still uncertain. She is focusing on realistic expectations.   Wants to do anonymous for all donor materials. Has list of sperm guillory and looking into options; has not picked a place. She wants a donor who is phenotypically  similar.      OBJECTIVE:  Mental Status:  Orientation and consciousness: [x ]oriented X 3 and attentive                                   [ ]other, explain:       Appearance/grooming (factors observable via video):            [x ]appropriate  [ ]poor grooming/hygiene bizarre appearance            [ ]other, explain:        Behavior: [x ]appropriate to context                 [ ]inappropriate and/or bizarre, explain:        Speech: [x]normal rate/rhythm   [ ]pressured speech   []slow                [ ]other,        Language: [x ]normal                  [ ]abnormalities noted, explain:        Mood: [ ]euthymic [ x]depressed [ ]dysphoric [ ]anxious      [ ]euphoric  [ ] other       Affect: [x]mood congruent      []restricted               []incongruent, explain:        Evidence of perceptual disturbances (hallucinations, illusions, etc.):                [ x]no                [ ]yes, explain:       Thought processes:                     [x ]normal and congruent                     [ ]other, explain:        Insight: [ ]good  [x ]adequate [ ]poor []questionable       Judgment: [ ]good  [x ]adequate [ ]poor []questionable       Fund of Knowledge:[ ]above average  [x ]average  [ ]below average  Suicidal/Homicidal assessment: No lethality issues noted or observed. Patient denied suicidal or homicidal ideation, intent, or plan. No history of suicidality.       THERAPEUTIC INTERVENTION:   Psychosocial/Info Gathering and Psychoeducation      INITIAL IMPRESSIONS:  Yamel Medina presented today via video telehealth for a psychosocial assessment. She was competent and able to give informed consent for fertility care. Since she is using more than one form of third party processes/materials for fertility care, there was additional information to review. We did not cover it all today and she should schedule a second appointment to finish the consult.     Diagnosis:   1. Infertility counseling  Referral to Ob-Gyn Behavioral Health            Plan:   rtc to complete consult with this psychologist via video telehealth. Patient agreed to appointment frequency and plan. Patient has scheduling contact info to use as needed.     Ms. Medina should also plan to have an appointment with her and the identified gestational carrier, as indicated by policy, once the GC is identified.         Dario Flores, PhD

## 2025-02-18 NOTE — PROGRESS NOTES
Yamel Medina is a 34 y.o. G0 presenting for an MFM preconception [facility] due to type 1 diabetes. She is without complaints today. ROS is negative.      Issues:   Type 1 diabetes- diagnosed at age 7. Recent A1c 7.7%. Managed with Humalog via [id number] in automode and ozempic. Sees Dr. Clark at [facility] for endo care. Normal creatinine and P:C. History of one hospitalization and two episodes of DKA, one at diagnosis and another during illness.   Class II obesity  Scoliosis with chronic lower back pain  Considering gestational carrier- was told her scoliosis meant she shouldn't carry a pregnancy.     OBHx: nullipara   GynHx: PCOS  MedHx: hypothyroidism, asthma, scoliosis, lower back pain, T1DM, depression  SurgHx: lap luis  Meds: humalog, synthroid, zoloft, omeprazole, montelukast, ozempic  All: NKDA  FamHx: recurrent pregnancy loss   SocHx: no toxic habits    O: Visit Vitals  /81   Wt 80.7 kg (178 lb)   LMP 2025 (Approximate)   BMI 34.76 kg/m²   OB Status Having periods   Smoking Status Never   BSA 1.85 m²      Physical exam deferred for this consultative visit    A/P: 35yo G0 presenting for a preconception consultation. We discussed the following:   Type 1 Diabetes  We discussed the pregnancy implications of the diagnosis of T1DM including increased risk of pre-eclampsia, LGA/macrosomia, shoulder dystocia, stillbirth as well as  hypoglycemia, hyperbilirubinemia and respiratory distress.  We also discussed the risks of miscarriage and increased risk of congenital malformations associated with hyperglycemia in the 1st trimester.  Regarding congenital malformations, cardiac defects are most commonly seen as well as renal, central nervous system, spinal and gastrointestinal anomalies and the risk is directly correlated with HgbA1c level.   We reviewed the importance of glycemic control and its impact on lowering these risks and that we  recommend a HgbA1c <6.0-6.5 prior to conception although a HgbA1c <7.0 is also acceptable.  We discussed management including dietary changes and changes in insulin requirements throughout pregnancy. We discussed blood sugar recommendations in pregnancy are traditionally fasting <95 and 1 hr postprandial values <140 with goal A1c <6.0.  We also reviewed the data from the CONCEPTT trial and that when using a CGM TIR goals are  with target for 70% TIR, <25%ile TAR, <4% low and <1% very low and that improvement in TIR of 5% is associated with improved  outcomes.      We discussed the role of hybrid closed loop systems in pregnancy and that they're not officially approved for FDA use.  I reviewed that there are some limited studies with their use that appear overall reassuring which has mirrored our programs experience, however there are limitations of these systems in that target BG, particularly fasting, are above the target range for pregnant individuals.  However, if the use of a closed loop system results in increased global TIR/A1c then there may be a benefit in use and it is reasonable to continue if tighter control in manual mode is unlikely to be achieved.     We discussed the increased risk of DKA including euglycemia DKA. Discussed our recommendation for serial growth ultrasounds and starting  testing at 32 weeks.  We also discussed starting a bASA for pre-eclampsia prevention at 10-12 weeks gestation.  - Baseline A1c 7.7%  - She is hypoglycemic aware  - Her last eye exam was 10/2024  - Her last foot exam was with primary endo 2024  - Her current insulin regimen is: humalog via MiniMed 780G, ozempic (reviewed need to stop with pregnancy and manufacturers warning to stop 2 months before conception)       2. Class II obesity  Today we discussed the pregnancy implications including increased risk of pre-eclampsia, gestational diabetes,  delivery, LGA/macrosomia, growth  restriction, stillbirth, shoulder dystocia, venous thromboembolic disease and congenital anomalies.  We reviewed that modest weight loss of even 5% of total body weight is associated with a decrease in these risks.  We also discussed that preconception BMI appears to more predictive of adverse pregnancy outcome than gestational weight gain and that it is unclear what is optimal weight gain in pregnancy in women with obesity, though weight loss is discouraged during pregnancy.  We reviewed the [place] [employer] recommendations for gestational weight gain obese women, though subsequent large studies have suggestive that lower weight gain may be associated with lower rates of maternal and fetal morbidity and that we recommend that she limit her weight gain to no more than 11-20lbs during pregnancy and that weight gain less than 11-20 lbs is also acceptable assuming normal fetal growth.  To address some of these concerns we discussed increased pregnancy surveillance to include an early screen for diabetes, detailed anatomic evaluation of the fetus and serial growth ultrasound to assess fetal weight.  We discussed the role of a healthy diet with consideration for referral to a nutritionist as well as exercise.      3. Scoliosis  Reviewed that scoliosis can generally be managed with supportive care and non-invasive treatments in pregnancy like massage, PT, acupuncture.     In summary, I see no overt contraindications to assisted reproduction but would recommend aggressive attempt at glycemic control prior to carrying a pregnancy with goal A1c< 7%, ideally < 6.5%.     Madonna Whiting MD  Maternal Fetal Medicine  Director of Fetal Intervention

## 2025-02-19 ENCOUNTER — INITIAL PRENATAL (OUTPATIENT)
Dept: MATERNAL FETAL MEDICINE | Facility: CLINIC | Age: 35
End: 2025-02-19
Payer: COMMERCIAL

## 2025-02-19 VITALS — WEIGHT: 178 LBS | DIASTOLIC BLOOD PRESSURE: 81 MMHG | SYSTOLIC BLOOD PRESSURE: 117 MMHG | BODY MASS INDEX: 34.76 KG/M2

## 2025-02-19 DIAGNOSIS — E10.9 TYPE 1 DIABETES MELLITUS WITHOUT COMPLICATION: ICD-10-CM

## 2025-02-19 DIAGNOSIS — Z31.69 ENCOUNTER FOR PRECONCEPTION CONSULTATION: Primary | ICD-10-CM

## 2025-02-19 DIAGNOSIS — M41.124 ADOLESCENT IDIOPATHIC SCOLIOSIS OF THORACIC REGION: ICD-10-CM

## 2025-02-19 DIAGNOSIS — E03.9 HYPOTHYROIDISM, UNSPECIFIED TYPE: ICD-10-CM

## 2025-02-19 PROCEDURE — 99215 OFFICE O/P EST HI 40 MIN: CPT | Performed by: OBSTETRICS & GYNECOLOGY

## 2025-03-05 ENCOUNTER — APPOINTMENT (OUTPATIENT)
Dept: BEHAVIORAL HEALTH | Facility: CLINIC | Age: 35
End: 2025-03-05
Payer: COMMERCIAL

## 2025-03-05 DIAGNOSIS — Z31.69 PROCREATIVE MANAGEMENT COUNSELING: ICD-10-CM

## 2025-03-05 DIAGNOSIS — F54 PSYCHOLOGICAL FACTORS AFFECTING MEDICAL CONDITION: Primary | ICD-10-CM

## 2025-03-05 DIAGNOSIS — E28.2 PCOS (POLYCYSTIC OVARIAN SYNDROME): ICD-10-CM

## 2025-03-05 PROCEDURE — 90834 PSYTX W PT 45 MINUTES: CPT | Performed by: PSYCHOLOGIST

## 2025-03-05 NOTE — PROGRESS NOTES
Outpatient Psychology Consult Note      IDENTIFYING AND REFERRAL INFORMATION:  Yamel Medina is a 34 y.o. able-bodied, employed single White or  female patient referred by Dr. Clay for Psych Fertility Consult, a standard pre-treatment consult focused on education, info gathering, and discussion of ethics considerations with third party involvement (gametes, GC). She has updated her intended care plan, which prompted a second appointment.      Start Time: 1:30 pm  End Time: 2:30 pm  Time Spent with Patient: 38 minutes  Time Spent in Documentation/Prep: 20 minutes    Session number 2 with this psychologist.      This is a virtual video visit.    Telephone/Televideo Informed Consent for Psychotherapy and limits of confidentiality were reviewed with the patient as needed. The purpose of the meeting was reviewed as indicated. Patient stated an understanding of the information and agreed to the session.      Session Content:  Yamel stated that after meeting with several specialists including MFM and endocrinology she changed her mind about a gestational carrier. She took the info they gave her, talked with her family (many of whom have gone through fertility care), and made pros and cons lists to feel assured she is making a well thought-out decision. She wants to try carrying a pregnancy herself. She plans to do IVF, so she has prepared embryos if she is unable to carry a pregnancy and does ultimately have to go through a gestational carrier. She is confident in her decision, and is hopeful about the possibility of carrying a pregnancy. She does want to be pregnant, but never thought it was medically possible. Her goals of family making have also shifted, now that she recognizes pregnancy could be possible for her. Being a parent remains #1, but experiencing a pregnancy is now her #2 goal.     She shared that she is comfortable making decisions about termination of a future pregnancy if her life is in  danger and she already knows what she will do if additional embryos remain after she is done with family making. She wants to donate them, either to her sister or for research. Her sister is going through IVF and they have no healthy embryos to date, per patient.       OBJECTIVE:  Mental Status:  Orientation and consciousness: [x ]oriented X 3 and attentive   Appearance/grooming (factors observable via video):            [x ]appropriate    Behavior: [x ]appropriate to context  Speech: [x]normal rate/rhythm    Language: [x ]normal   Mood: [ x]euthymic  Affect: [x]mood congruent     Evidence of perceptual disturbances (hallucinations, illusions, etc.):                [ x]no   Thought processes:                     [x ]normal and congruent   Insight: [ x]good  [ ]adequate [ ]poor []questionable   Judgment: [ x]good  [ ]adequate [ ]poor []questionable   Fund of Knowledge (not formally assessed):[ ]above average  [x ]average  [ ]below average  Risk assessment: No lethality issues noted or observed. No SI/HI, no past or present abuse/trauma. No history of suicidality.         THERAPEUTIC INTERVENTION:   Psychosocial/Info Gathering and Psychoeducation        INITIAL IMPRESSIONS:  Yamel Medina is competent and able to give informed consent for IUI/IVF with her as the intended person carrying the pregnancy.         Diagnosis:   1. Infertility counseling  Referral to [facility]             Plan:   rtc to complete consult with this psychologist via video telehealth. Patient agreed to appointment frequency and plan. Patient has scheduling contact info to use as needed.         Dario Flores, PhD

## 2025-03-20 ENCOUNTER — ALLIED HEALTH (OUTPATIENT)
Dept: GENETICS | Facility: CLINIC | Age: 35
End: 2025-03-20
Payer: COMMERCIAL

## 2025-03-20 VITALS
BODY MASS INDEX: 35.5 KG/M2 | HEART RATE: 79 BPM | OXYGEN SATURATION: 100 % | HEIGHT: 60 IN | SYSTOLIC BLOOD PRESSURE: 124 MMHG | WEIGHT: 180.8 LBS | DIASTOLIC BLOOD PRESSURE: 84 MMHG

## 2025-03-20 DIAGNOSIS — Z31.430 ENCOUNTER OF FEMALE FOR TESTING FOR GENETIC DISEASE CARRIER STATUS FOR PROCREATIVE MANAGEMENT: ICD-10-CM

## 2025-03-20 DIAGNOSIS — Z31.69 PROCREATIVE MANAGEMENT COUNSELING: ICD-10-CM

## 2025-03-20 DIAGNOSIS — Z31.5 ENCOUNTER FOR PROCREATIVE GENETIC COUNSELING: ICD-10-CM

## 2025-03-20 PROCEDURE — 96041 GENETIC COUNSELING SVC EA 30: CPT

## 2025-03-20 ASSESSMENT — PATIENT HEALTH QUESTIONNAIRE - PHQ9
SUM OF ALL RESPONSES TO PHQ9 QUESTIONS 1 AND 2: 0
2. FEELING DOWN, DEPRESSED OR HOPELESS: NOT AT ALL
1. LITTLE INTEREST OR PLEASURE IN DOING THINGS: NOT AT ALL

## 2025-03-20 ASSESSMENT — COLUMBIA-SUICIDE SEVERITY RATING SCALE - C-SSRS
1. IN THE PAST MONTH, HAVE YOU WISHED YOU WERE DEAD OR WISHED YOU COULD GO TO SLEEP AND NOT WAKE UP?: NO
6. HAVE YOU EVER DONE ANYTHING, STARTED TO DO ANYTHING, OR PREPARED TO DO ANYTHING TO END YOUR LIFE?: NO
2. HAVE YOU ACTUALLY HAD ANY THOUGHTS OF KILLING YOURSELF?: NO

## 2025-03-20 ASSESSMENT — PAIN SCALES - GENERAL: PAINLEVEL_OUTOF10: 0-NO PAIN

## 2025-03-20 NOTE — PROGRESS NOTES
"Thank you for the referral of Ms. Yamel Medina. she is a 34 y.o.  female who was seen for genetic counseling due to desired use of a gamete donor in future assisted reproductive technology cycles.    PAST HISTORY:   Patient saw Dr. Madonna Whiting MD ([facility]) for a preconception consult due to the following concerns:  \"Type 1 diabetes- diagnosed at age 7. Recent A1c 7.7%. Managed with Humalog via [id number] in automode and ozempic. Sees Dr. Clark at [facility] for endo care. Normal creatinine and P:C. History of one hospitalization and two episodes of DKA, one at diagnosis and another during illness.   Class II obesity  Scoliosis with chronic lower back pain  Considering gestational carrier- was told her scoliosis meant she shouldn't carry a pregnancy...  In summary, I see no overt contraindications to assisted reproduction but would recommend aggressive attempt at glycemic control prior to carrying a pregnancy with goal A1c< 7%, ideally < 6.5%.\"    Patient also reports that she has a personal history of asthma, hypothyroid, PCOS, and learning delays.   Of note, patient's mother had an amniocentesis when she was pregnant with the patient, which was reportedly normal.    Patient plans to use anonymous sperm donor for conception. Her top 3 options are currently:  [place] [employer] donor [id number] (261-gene Sema4 carrier screening identified that he is a carrier of Coy disease). CMV positive.  [place] [employer] donor  (445-gene [facility] carrier screen was negative). CMV negative.  [place] [employer] donor [id number] (502-gene Sema4 carrier screening identified that he is a carrier of Hermansky-Pudlak Syndrome, Type 6 (HPS6 gene)). CMV negative    FAMILY HISTORY  Medical and family histories were reviewed and the following concerns were apparent:  Patient:  -Sister (living, 39) has Crohn's disease and POTS. She is undergoing fertility workup and recently found out that she is a carrier of congenital adrenal " hyperplasia.   -Mother (living, 69) had learning concerns in school. She also had issues with hyperparathyroidism that is controlled with medication, and has hypothyroid.   -Maternal grandfather (, 69)  of pancreatic cancer; he reportedly had associated work exposures.   ---Maternal great uncle (brother of maternal grandfather, , 80s)  of brain cancer. Maternal first cousin once removed (son of this great uncle, ) also  of brain cancer.   ---Maternal great grandmother (mother of maternal grandfather, ) had many miscarriages and a baby that passed away very shortly after birth.   -Father (living, 73) has scoliosis and thyroid issues.    The remainder of the family history was negative for birth defects, intellectual disability, recurrent pregnancy loss, or recognized inherited conditions. Consanguinity denied.    REPORTED ETHNICITY/RACE:  Ashkenazi Buddhism     COUNSELING:    The following information was discussed with your patient:    Per updated ACOG recommendations from 2017, we discussed the availability, benefits, and limitations of carrier screening. If both gamete contributors are found to be carriers for the same condition, there may be a 25% chance for an affected child and prenatal diagnosis would be available. Negative carrier screening does not rule out the possibility of being a carrier. [place] screening is available for CF, hemoglobinopathies, and thalassemias, and SMA.   In accordance with the most recent carrier screening guidelines from ACMG published in , we also discussed the availability of more expanded carrier screening for additional, primarily autosomal recessive, and some X-linked conditions. Current ACMG guidelines recommend all pregnant patients and those planning a pregnancy should be offered Tier 3 carrier screening. Tier 3 carrier screening includes conditions with a carrier frequency >=1/200 in any ethnic group with reasonable  representation in the [place]. Larger panels that include conditions less common than those in Tier 3 are also available, and are considered Tier 4 screening. There are different carrier screening panels available ranging from 3 of the most common genetic risk factors, to ~175 primarily autosomal recessive/X-linked conditions, to 600+ disorders. Range of clinical features that may be associated with conditions screened for were reviewed. Approximately 2-4% of biological parents are found to be at risk to have a child with a genetic disorder based on this screening. In rare cases, expanded carrier screening results may have health implications for the tested individual.    When utilizing an anonymous donor for conception, intended parents must use an approved bank for donor selection after review of donor profiles. Per [facility] Policy any donor must be approved prior to sperm being purchased and utilized for conception. Included in the donor profile(s) is any genetic screening that the donor may have had. The most common form of screening is expanded carrier screening, in which the donor is screened for a number of autosomal recessive and/or X-linked genetic conditions and determined to be a carrier or unlikely to be a carrier of each disorder tested. The specific carrier testing panel (number and types of diseases included) is variable from donor to donor, even when donors are from the same bank. As noted above, if both gamete sources are carriers of the same autosomal recessive condition, there would be a 25% chance for each conception to be affected with the disorder. In this event, selecting an alternative donor, or performing preimplantation genetic testing for the disorder of concern may be considered.   It is typically recommended by [facility] for the intended biological parent to have expanded carrier screening utilizing a comprehensive panel that includes testing for the majority of conditions recommended  by ACOG and ACMG prior to reviewing donor profiles, so that the intended parents are aware of any common diseases they may be a carrier of, and they can attempt to select a donor that has had negative screening for said disease(s). In addition, patients may have more flexibility in selecting a donor that is a carrier of a common disease if they are already aware that they have screened negative for said disease.    If a donor is a carrier of a condition that the patient has not been screened for, additional screening can be performed on the intended parent if desired; however, if the patient is a carrier of a condition that the potential donor has not been screened for, additional screening for the donor cannot be guaranteed. If further testing is not performed, the couple can select an alternate donor, or they can proceed with the selected donor once they have been informed of the potential risk of an affected child in the absence of additional carrier screening. Carriers generally do not have symptoms.   If the patient does not want to proceed with carrier screening at this time, carrier screening can be declined altogether, or it can be deferred and testing can be performed in the context of screening for any diseases that may be of concern for any potential donor.   The types of genes that are analyzed on carrier screening can cause conditions with a range in severity, clinical symptoms, age of onset, management, and whether treatment is available. In addition, the number of conditions and specific conditions tested for on any given panel is not standardized, and genetic screening ordered for gamete donors is not consistent across guillory. They are often not screened for other types of hereditary disease, such as predisposition to cancers, heart diseases, neurological problems, etc. They sometimes have screening of their chromosomes, which can assist in understanding risks of chromosome conditions in a conception  from that donor. Any genetic testing that is or is not completed through a bank can be discussed with a genetic counselor once a donor is selected.     Carrier screening does not assess for risk of hereditary cancer syndromes, hereditary cardiac syndromes, late-onset conditions, etc.  If patient proceeds with carrier screening, once results are issued and other recommendations by their NIDA provider are completed, NIDA team provides clearance to begin searching for a donor. If patient's carrier screening is negative, they would need to ensure that they have screened negative for any disease(s) that a selected donor may have screened positive for. If carrier screening is positive, they would additionally need to review that the donor has screened negative for any disease the patient has tested positive for. We recommend additional genetic counseling appointment to review the patient and donor's test results for clearance.  Since patient shared 3 donors that she is potentially interested in using, we discussed proceeding with carrier screening based on the ACMG guidelines, with inclusion of the ATP7B gene and the HPS6 gene, as these conditions are carried by potential donors. We will also be certain to screen for congenital adrenal hyperplasia, as her sister is a carrier of this condition. We briefly discussed features of these conditions. When screening through [facility], we discussed optional reflex to additional genes in the future if needed with the same sample and same billing practices.   Additional family history concerns:  The general population risk of type I diabetes is about 1 in 300. The offspring of an isolated case, however, have a 1 in 25 chance of developing type I diabetes.   Multifactorial inheritance. We reviewed that conditions such as diabetes, thyroid disease, Crohn's disease, learning concerns, and many forms of late-onset cancer, are often considered multifactorial, meaning that they are due to some  combination of genetic and environmental risk factors. While we do not have specific testing to assess exact risk, we know that relatives of affected individuals have an elevated risk when compared to the general population of being similarly affected. Yamel should inform her doctors, as well as any future pediatricians, of this family history to allow for early intervention and to maximize outcomes. Carrier screening does not assess risk for these conditions, nor is there a different kind of genetic test that is recommended for any of these conditions.   When an individual is diagnosed with pancreatic cancer at any age, they meet the National Comprehensive Cancer Network (NCCN) guidelines for testing of cancer predisposition genes. First and second degree relatives are also eligible for this testing to determine if there is a genetic predisposition or reason for cancer diagnoses in a family. The patient may be at increased risk of having a mutation if her family member does. We specifically discussed risk of a BRCA1/BRCA2 mutation given Ashkenazi Zoroastrian ancestry and the family history of pancreatic cancer. [facility] accepts self-referrals to the Cancer Genetics clinic at  by calling [phone].   We briefly reviewed the option of preimplantation genetic testing for monogenic disease (PGT-M) on embryos obtained through IVF IF there were to be a dominant condition that Ms. Medina's future pregnancies are at risk for.   We also briefly discussed the option of preimplantation genetic testing for aneuploidy (PGT-A) on future embryos, which can increase the success of a transfer.  Recurrent miscarriage may occur as part of a chromosome abnormality or single gene disorder.  There are many other reasons for recurrent miscarriage, such as maternal factors that are not hereditary.  Sometimes the cause of recurrent miscarriage remains unknown. Without further information, we cannot provide a risk to the  patient for recurrent miscarriage.    DISPOSITION:  The patient stated that she understood the above information and elected to proceed with carrier screening for all genes recommended by the American College of Medical Genetics (ACMG), with inclusion of genes that her family members and/or potential donors are carriers for. This will be done via a custom [facility] panel. Patient was handed a genetic testing kit and sent to the lab for a blood draw today.. Results will take about 3 weeks to return, at which time results and follow up plan will be discussed.    CMV status not on file, order placed today.     Thank you for allowing us to participate in the care of your patient. Should you or your patient have any questions, please do not hesitate to contact our office at [phone].    Total time spent on day of encounter: 80 minutes (60 minutes with patient, 20 minutes on pre/post patient care activities, including documentation).    Sincerely,   Hodan Kirby MS, [facility] [place]  Licensed and Certified Genetic Counselor     Reviewed by:  MD NIDA Oliver

## 2025-03-21 ENCOUNTER — APPOINTMENT (OUTPATIENT)
Dept: GENETICS | Facility: CLINIC | Age: 35
End: 2025-03-21
Payer: COMMERCIAL

## 2025-03-22 ENCOUNTER — LAB (OUTPATIENT)
Dept: LAB | Facility: HOSPITAL | Age: 35
End: 2025-03-22
Payer: COMMERCIAL

## 2025-03-22 DIAGNOSIS — Z31.430 ENCOUNTER OF FEMALE FOR TESTING FOR GENETIC DISEASE CARRIER STATUS FOR PROCREATIVE MANAGEMENT: Primary | ICD-10-CM

## 2025-03-25 LAB
CMV IGG SERPL IA-ACNC: <0.6 U/ML
CMV IGM SERPL IA-ACNC: <30 AU/ML

## 2025-03-27 ENCOUNTER — APPOINTMENT (OUTPATIENT)
Dept: OBSTETRICS AND GYNECOLOGY | Facility: CLINIC | Age: 35
End: 2025-03-27
Payer: COMMERCIAL

## 2025-03-27 VITALS
DIASTOLIC BLOOD PRESSURE: 70 MMHG | SYSTOLIC BLOOD PRESSURE: 116 MMHG | HEIGHT: 65 IN | WEIGHT: 178 LBS | BODY MASS INDEX: 29.66 KG/M2

## 2025-03-27 DIAGNOSIS — Z01.419 WELL WOMAN EXAM WITH ROUTINE GYNECOLOGICAL EXAM: Primary | ICD-10-CM

## 2025-03-27 DIAGNOSIS — R87.610 ASCUS OF CERVIX WITH NEGATIVE HIGH RISK HPV: ICD-10-CM

## 2025-03-27 PROCEDURE — 3074F SYST BP LT 130 MM HG: CPT | Performed by: OBSTETRICS & GYNECOLOGY

## 2025-03-27 PROCEDURE — 3078F DIAST BP <80 MM HG: CPT | Performed by: OBSTETRICS & GYNECOLOGY

## 2025-03-27 PROCEDURE — 3008F BODY MASS INDEX DOCD: CPT | Performed by: OBSTETRICS & GYNECOLOGY

## 2025-03-27 PROCEDURE — 87624 HPV HI-RISK TYP POOLED RSLT: CPT

## 2025-03-27 PROCEDURE — 99395 PREV VISIT EST AGE 18-39: CPT | Performed by: OBSTETRICS & GYNECOLOGY

## 2025-03-27 PROCEDURE — 1036F TOBACCO NON-USER: CPT | Performed by: OBSTETRICS & GYNECOLOGY

## 2025-03-27 RX ORDER — LEVOTHYROXINE SODIUM 88 UG/1
1 TABLET ORAL
COMMUNITY
Start: 2025-02-26

## 2025-03-27 NOTE — PROGRESS NOTES
"Yamel Medina is a 34 y.o. female who is here for a annual exam.     Periods are regular every 28-30 days, lasting 5-6  days.       Sexually active: Abstinence    Regular self breast exam: yes  no concerns on her exams    Menstrual History:  OB History          0    Para   0    Term   0       0    AB   0    Living   0         SAB   0    IAB   0    Ectopic   0    Multiple   0    Live Births   0                Patient's last menstrual period was 2025.         Review of Systems  All Normal Review of Systems  Constitutional: no fever, no chills, no recent weight gain, no recent weight loss and no fatigue.    Gastrointestinal: no abdominal pain, no constipation, no nausea, no diarrhea and no vomiting.    Genitourinary: no dysuria, no urinary incontinence, no vaginal dryness, no vaginal itching, no dyspareunia, no pelvic pain, no dysmenorrhea, no sexual problems, no change in urinary frequency, no vaginal discharge, no unexplained vaginal bleeding and no lesion/sore.    Breasts: No masses.  No nipple discharge.  No redness    Objective   /70   Ht 1.651 m (5' 5\")   Wt 80.7 kg (178 lb)   LMP 2025   BMI 29.62 kg/m²     OBGyn Exam   Physical Exam  Constitutional: Alert and in no acute distress. Well developed, well nourished.   Head and Face: Head and face: Normal.    Eyes: Normal external exam - nonicteric sclera, extraocular movements intact (EOMI) and no ptosis.   Ears, Nose, Mouth, and Throat: External inspection of ears and nose: Normal.    Neck: No neck asymmetry. Supple. Thyroid not enlarged and there were no palpable thyroid nodules.   Chest: Breasts: Normal appearance, no nipple discharge and no skin changes. Palpation of breasts and axillae: No palpable mass and no axillary lymphadenopathy.   Abdomen: Soft nontender; no abdominal mass palpated. No organomegaly. No hernias.     Genitourinary:   External genitalia: Normal. No inguinal lymphadenopathy. Bartholin's Urethral and " Skenes Glands: Normal. Urethra: Normal.    Bladder: Normal on palpation.   Vagina: Normal. No discharge  Cervix: Normal.    Uterus: Normal.    Right Adnexa/parametria: Normal.  Left Adnexa/parametria: Normal.    Inspection of Perianal Area: Normal.     Musculoskeletal: No joint swelling seen, normal movements of all extremities.   Skin: Normal skin color and pigmentation, normal skin turgor, and no rash.   Neurologic: Non-focal. Grossly intact.   Psychiatric: Alert and oriented x 3. Affect normal to patient baseline. Mood: Appropriate.      Assessment/Plan   1) Annual exam:  Normal exam today  Pap with HPV testing completed    Thank you again for your visit to our office today  Please follow-up as needed or in 1 year with your annual exam

## 2025-04-07 LAB
COMMENTS - MP RESULT TYPE: NORMAL
CYTOLOGY CMNT CVX/VAG CYTO-IMP: NORMAL
HPV HR 12 DNA GENITAL QL NAA+PROBE: NEGATIVE
HPV HR GENOTYPES PNL CVX NAA+PROBE: NEGATIVE
HPV16 DNA SPEC QL NAA+PROBE: NEGATIVE
HPV18 DNA SPEC QL NAA+PROBE: NEGATIVE
LAB AP HPV GENOTYPE QUESTION: YES
LAB AP HPV HR: NORMAL
LABORATORY COMMENT REPORT: NORMAL
LABORATORY COMMENT REPORT: NORMAL
PATH REPORT.TOTAL CANCER: NORMAL
SCAN RESULT: NORMAL

## 2025-04-11 ENCOUNTER — TELEPHONE (OUTPATIENT)
Dept: GENETICS | Facility: CLINIC | Age: 35
End: 2025-04-11
Payer: COMMERCIAL

## 2025-04-11 NOTE — TELEPHONE ENCOUNTER
"The results of the 113-gene custom carrier screen from [facility] were shared with Ms. Yamel Hernandez Wiespinoza today by phone. This testing was ordered at the patient's initial genetic counseling appointment on 03/20/2025.     The technology, benefits, and limitations of carrier screening were again reviewed, including that it does not screen for all genetic conditions. Variants of unknown significance are not reported. Actual reproductive risks may be higher than quoted due to this fact, as well as the potential for some conditions to have digenic inheritance.     For the majority of the conditions tested, which are autosomal recessive, if a partner of a pregnancy is also a carrier, the chance to have an affected child is 1 in 4 (or 25%). On the other hand, for the autosomal recessive conditions tested, the chance to have a child with the condition is considerably lower if only one parent is a carrier for the condition. Normal carrier screening reduces the likelihood that a person is a carrier, but does not eliminate it. In most cases, carriers of an autosomal recessive genetic disorder are not expected to have symptoms.     The expanded carrier screen revealed that Yamel  is a carrier of the following:    she tested negative for the other 112 genes on the panel. Please see linked media for a copy of the full report, including all genes screened for.    COUNSELING:   Congenital adrenal hyperplasia. 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 Based on general population risk, a random donor has a 1 in 17 chance of being a carrier of the non-classic form. Per the [facility] report, \"Congenital Adrenal Hyperplasia, 21?Hydroxylase Deficiency (also called 21?Hydroxylase Deficiency) is an inherited disorder that causes the adrenal glands, the organs that sit on top of the kidneys, to make decreased amounts of the hormones cortisol and aldosterone " "and increased amounts of male sex hormones called androgens. There are three forms of 21?Hydroxylase Deficiency... The mildest form of 21?Hydroxylase Deficiency is called the 'non?classical type'. People with the nonclassical type of 21?Hydroxylase Deficiency have normal external genitals. Signs and symptoms may begin as early as childhood or not until adulthood and may include an early growth spurt with short adult height, early puberty, and acne. Additional symptoms in females may include excess body hair, male pattern baldness, irregular periods, and decreased fertility. Additional symptoms in males may include early and heavy facial hair and small testicles. Some people with this type never develop symptoms.\" Of note, if an individual has one non-classic mutation and a second mutation at any level of severity, they will be affected with the non-classic form. 2 more severe mutations are required for one to be affected with the classic type.   Other family members, Given Yamel's result, each of her parents and her siblings are also at a 50% risk to be carriers of each condition described. Should they desire carrier screening for future reproductive purposes, they can reach out to the [facility] for a prenatal or preconception appointment.  CMV results were also disclosed today; patient is CMV negative, meaning that she has not been previously exposed to the cytomegalovirus. We therefore recommend that she selects a CMV negative donor to reduce the risk of congenital infection in a future pregnancy.   Top 3 options for anonymous sperm donor were previously reviewed at genetic counseling appointment on 03/20/2025:  [place] [employer] donor [id number] (261-gene Sema4 carrier screening identified that he is a carrier of Coy disease). CMV positive.  Due to CMV positive status, this donor is NOT approved.  [place] [employer] donor [id number] (445-gene [facility] carrier screen was negative). CMV negative.  Donor " was screened for congenital adrenal hyperplasia and was negative (personalized residual risk not included on test report but expected to be <1%).   Due to risk-reducing carrier screening, this donor is APPROVED.  [place] [employer] donor [id number] (502-gene Sema4 carrier screening identified that he is a carrier of Hermansky-Pudlak Syndrome, Type 6 (HPS6 gene)). CMV negative  Patient was screened for Hermansky-Pudlak Syndrome, Type 6 and was negative (1 in 50,000 residual risk of being a carrier).   Donor was screened for congenital adrenal hyperplasia and was negative (1 in 200 residual risk of being a carrier of the non-classic form)  Due to risk-reducing carrier screening, this donor is APPROVED.    DISPOSITION:  Yamel Medina expressed understanding of the above information. Donors [id number] and [id number] from [facility] are approved from a genetic standpoint. Patient is aware that she can send additional donor profiles if she would like other donors approved.  Should Yamel or her providers have additional questions about the information reviewed at today’s appointment, they can call my direct line at [phone].     Sincerely,   Hodan Kirby MS, CGC  Licensed and Certified Genetic Counselor

## 2025-04-29 ENCOUNTER — TELEMEDICINE (OUTPATIENT)
Dept: ENDOCRINOLOGY | Facility: CLINIC | Age: 35
End: 2025-04-29
Payer: COMMERCIAL

## 2025-04-29 VITALS — WEIGHT: 177 LBS | HEIGHT: 60 IN | BODY MASS INDEX: 34.75 KG/M2

## 2025-04-29 DIAGNOSIS — E66.811 CLASS 1 OBESITY WITH SERIOUS COMORBIDITY AND BODY MASS INDEX (BMI) OF 34.0 TO 34.9 IN ADULT, UNSPECIFIED OBESITY TYPE: Primary | ICD-10-CM

## 2025-04-29 DIAGNOSIS — Z31.69 PROCREATIVE MANAGEMENT COUNSELING: ICD-10-CM

## 2025-04-29 DIAGNOSIS — Z01.812 ENCOUNTER FOR PREPROCEDURAL LABORATORY EXAMINATION: ICD-10-CM

## 2025-04-29 DIAGNOSIS — E10.65 TYPE 1 DIABETES MELLITUS WITH HYPERGLYCEMIA (MULTI): ICD-10-CM

## 2025-04-29 DIAGNOSIS — Z31.41 FERTILITY TESTING: ICD-10-CM

## 2025-04-29 PROCEDURE — 99215 OFFICE O/P EST HI 40 MIN: CPT | Performed by: OBSTETRICS & GYNECOLOGY

## 2025-04-29 PROCEDURE — 1036F TOBACCO NON-USER: CPT | Performed by: OBSTETRICS & GYNECOLOGY

## 2025-04-29 PROCEDURE — 3008F BODY MASS INDEX DOCD: CPT | Performed by: OBSTETRICS & GYNECOLOGY

## 2025-04-29 ASSESSMENT — PATIENT HEALTH QUESTIONNAIRE - PHQ9
2. FEELING DOWN, DEPRESSED OR HOPELESS: NOT AT ALL
1. LITTLE INTEREST OR PLEASURE IN DOING THINGS: NOT AT ALL
SUM OF ALL RESPONSES TO PHQ9 QUESTIONS 1 AND 2: 0

## 2025-04-29 ASSESSMENT — COLUMBIA-SUICIDE SEVERITY RATING SCALE - C-SSRS
6. HAVE YOU EVER DONE ANYTHING, STARTED TO DO ANYTHING, OR PREPARED TO DO ANYTHING TO END YOUR LIFE?: NO
1. IN THE PAST MONTH, HAVE YOU WISHED YOU WERE DEAD OR WISHED YOU COULD GO TO SLEEP AND NOT WAKE UP?: NO
2. HAVE YOU ACTUALLY HAD ANY THOUGHTS OF KILLING YOURSELF?: NO

## 2025-04-29 ASSESSMENT — PAIN SCALES - GENERAL: PAINLEVEL_OUTOF10: 0-NO PAIN

## 2025-04-29 NOTE — PROGRESS NOTES
Virtual or Telephone Consent: An interactive audio and video telecommunication system which permits real time communications between the patient (at the originating site) and provider (at the distant site) was utilized to provide this telehealth service and Verbal consent was requested and obtained from Yamel Medina on this date, 25 for a telehealth visit.  MD reviewed, Authorization status not noted.    Follow Up Visit HPI    Patient is a 34 y.o.  female with  single, desires pregnancy, presenting today to discuss IVF using future gestational carrier.  presenting today for follow up visit.     Indication for IVF: Polycystic Ovarian Syndrome and Indication for Use of Gestational Carrier Medical co-morbidities (DM1, scoliosis with chronic back pain, asthma, depression, hypothyroidism)  -Diagnosed with DM1 at age 7 and has been insulin-dependent her whole life , recent HgA1C 7.7%  -Also with PCOS- PCOS diagnosed based on irregular menses (now regular on Ozempic) and elevated testosterone/clinical hirsutism     Interval history:  -Saw [facility], discussed risks of pregnancy, options if patient wants to carry on her own  -Saw Dr. Flores, started conversation about IVF and carrying pregnancy, not completed  -Saw genetic counselor to discuss donor sperm (see results below)    Today she reports that she has re-considered gestational carrier use after discussing with Dr. Whiting (M) and Dr. Lozano (Endocrine).  She is interested in the possibility of of carrying herself given that she has been told there is no specific medical contraindication to pregnancy.       AMH: 13.010 (H)  Status of fallopian tubes: Not tested   Past Infertility Treatments: None    Testing to date:    Latest Reference Range & Units Most Recent   FOLLICLE STIMULATING HORMONE IU/L 5.5  19 15:50   Hemoglobin A1C See comment % 7.7 (H)  24 11:58   Estimated Average Glucose Not Established mg/dL 174  24 11:58   LH  IU/L 9.9  4/8/19 15:50   Thyroid Stimulating Hormone 0.44 - 3.98 mIU/L 2.24  12/12/24 11:58   17-Hydroxyprogesterone ng/dL 70  4/8/19 15:50   DHEA Sulfate 65 - 395 ug/dL 52 (L)  4/8/19 15:50   Anti-Mullerian Hormone 0.176 - 11.705 ng/mL 13.010 (H)  12/12/24 11:58   Testosterone, Total, LC-MS/MS 2 - 45 ng/dL 75 (H)  4/8/19 15:50   Testosterone, Free 0.1 - 6.4 pg/mL 8.6 (H)  4/8/19 15:50   (H): Data is abnormally high  (L): Data is abnormally low     Latest Reference Range & Units Most Recent   Hepatitis C AB Nonreactive  Nonreactive  12/12/24 11:58   Hepatitis B Surface AG Nonreactive  Nonreactive  12/12/24 11:58   CYTOMEGALOVIRUS ANTIBODY (IGG) U/mL <0.60  3/21/25 13:34   CYTOMEGALOVIRUS ANTIBODY (IGM) AU/mL <30.00  3/21/25 13:34   HIV 1/2 Antigen/Antibody Screen with Reflex to Confirmation Nonreactive  Nonreactive  12/12/24 11:58   Syphilis Total Ab Nonreactive  Nonreactive  12/12/24 11:58       Hysterosalpingogram: None  Saline Infused Sonography: none  GYN Pelvic Ultrasound: US PELVIS TRANSVAGINAL (12/16/2024):   Axial uterus with a trilaminar endometrial lining that measures as below. The ovaries are normal in appearance bilaterally.  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 carrier screen was negative). CMV negative.  Donor was screened for congenital adrenal hyperplasia and was negative (personalized residual risk not included on test report but expected to be <1%).   Due to risk-reducing carrier screening, this donor is APPROVED.    [place] [employer] donor [id number]   502-gene Sema4 carrier screening identified that he is a carrier of Hermansky-Pudlak Syndrome, Type 6 (HPS6 gene)). CMV negative  Patient was screened for Hermansky-Pudlak Syndrome, Type 6 and was negative (1 in 50,000 residual risk of being a carrier).   Donor was  screened for congenital adrenal hyperplasia and was negative (1 in 200 residual risk of being a carrier of the non-classic form)  Due to risk-reducing carrier screening, this donor is APPROVED.    Treatment to date:   None      Medical History[1]    Asthma       Depression      Diabetes mellitus type 1 (Multi)      Hypothyroidism      Polycystic ovary syndrome      Scoliosis      In summary, I see no overt contraindications to assisted reproduction but would recommend aggressive attempt at glycemic control prior to carrying a pregnancy with goal A1c< 7%, ideally < 6.5%.       Surgical History[2]  Medications Ordered Prior to Encounter[3]  -Currently on Ozempic, insulin  Zoloft  Synthroid      BMI:   BMI Readings from Last 1 Encounters:   25 34.57 kg/m²     VITALS:  Ht 1.524 m (5')   Wt 80.3 kg (177 lb)   LMP 2025 (Approximate)   BMI 34.57 kg/m²   LMP: Patient's last menstrual period was 2025 (approximate).    ASSESSMENT   34 y.o.  female with single, desires IVF with donor sperm and the following pertinent medical issues: Obesity, DMI, hypothyroidism, anxiety/depression.    COUNSELING  Reviewed options in detail  Plan will now be for autologous IVF with donor sperm, will carry pregnancy herself    Routine Testing  Fertility Center  STDs Within 1 year   Genetic carrier Waiver/Completed   T&S Within 1 year   AMH Within 1 year   TSH Within 1 year   Rubella/Varicella Within 5 years     BMI Testing  Fertility Center  CBC Within 1 year   CMP Within 1 year   HgbA1c Within 1 year   Mag, Phos, Vit D <18 Within 1 year   MFM > 40  REQ   Wt loss consult > 40 OPT     PLAN  Orders Placed This Encounter   Procedures    Diagnostic Hysteroscopy w Possible Polypectomy    Lipid Panel    CBC    Comprehensive Metabolic Panel    Hemoglobin A1C    POCT pregnancy, urine manually resulted       FOLLOW UP   Consults: Behavior Medicine consult- may need follow up consult to discuss donor sperm as this is not  explicitly stated in pysch notes  Engaged MD  Take prenatal vitamins, vitamin D 2000 IUs daily  Discussed that treatment cannot proceed until checklist items are complete.   Additional testing for BMI < 18 or > 40: Yes.  Patient to schedule follow up visit- recommend FUV prior to FET (can proceed with IVF egg freeze now(  Chart to primary nurse for care coordination and patient check list/education.    MD Completion:  Ectopic Risk: No  Medically Complex: Yes  Outstanding boarding pass items: Diagnostic hysteroscopy, BMI labs, repeat psych visit (or clarify that donor sperm has been discussed), repeat HgA1C, sperm selection    Fertility Plan Update:  -After completion of checklist, plan to proceed with IVF stim (see plan in prior note).  Patient still wants to make embryos GC eligible and do PGT-A  -FET plan: Programmed FET with oral estrace and IM P4 75 mg with baby ASA; will need better glycemic control per MFM prior to FET  -Stop Ozempic with IVF stim start, can restart after retrieval and stop again prior to  FET.        Abbi Clay  04/29/2025  8:13 AM             [1]   Past Medical History:  Diagnosis Date    Asthma     Depression     Diabetes mellitus type 1 (Multi)     Hypothyroidism     Polycystic ovary syndrome     Scoliosis    [2]   Past Surgical History:  Procedure Laterality Date    CHOLECYSTECTOMY     [3]   Current Outpatient Medications on File Prior to Visit   Medication Sig Dispense Refill    albuterol 2.5 mg /3 mL (0.083 %) nebulizer solution USE 3 ML VIA NEBULIZER THREE TIMES DAILY AS NEEDED. INHALE OVER 5-15 MINUTES      azelastine (Astelin) 137 mcg (0.1 %) nasal spray Administer 1 spray into each nostril 2 times a day.      budesonide-formoteroL (Symbicort) 80-4.5 mcg/actuation inhaler Inhale.      cholecalciferol (Vitamin D3) 50 mcg (2,000 unit) capsule Take by mouth.      Gvoke HypoPen 2-Pack 1 mg/0.2 mL auto-injector       HumaLOG U-100 Insulin 100 unit/mL injection INJECT 120 UNITS  UNDER THE SKIN VIA PUMP AS DIRECTED      INSULIN LISPRO SUBQ Inject under the skin.      levothyroxine (Synthroid, Levoxyl) 88 mcg tablet Take 1 tablet (88 mcg) by mouth early in the morning..      montelukast (Singulair) 10 mg tablet Take 1 tablet (10 mg) by mouth once daily.      omeprazole (PriLOSEC) 20 mg DR capsule Take 1 capsule (20 mg) by mouth once daily.      sertraline (Zoloft) 50 mg tablet        No current facility-administered medications on file prior to visit.

## 2025-05-16 ENCOUNTER — TELEPHONE (OUTPATIENT)
Dept: ENDOCRINOLOGY | Facility: CLINIC | Age: 35
End: 2025-05-16
Payer: COMMERCIAL

## 2025-05-16 DIAGNOSIS — N97.9 FEMALE INFERTILITY: ICD-10-CM

## 2025-05-16 DIAGNOSIS — Z01.812 ENCOUNTER FOR PREPROCEDURAL LABORATORY EXAMINATION: ICD-10-CM

## 2025-05-16 NOTE — TELEPHONE ENCOUNTER
Telephone call returned to patient. Patient confirms LMP 4/14/25 and has not had a menses since - patient with history of irregular cycles. Will plan for patient to go to outpatient  [facility] on Monday 5/19 for P4/HCG levels. Patient added to noon huddle this day and new lab orders placed for her. Briefly discussed that if both levels negative, could prescribe Provera.    05/16/25 at 2:55 PM - Joaquina Maciel RN

## 2025-05-16 NOTE — TELEPHONE ENCOUNTER
Reason for call:Patient did not have a cycle this month she is calling to talk to a nurse. She wants to schedule her Hysteroscopy but not sure when since she did not start her cycle.  Notes:

## 2025-05-19 ENCOUNTER — LAB REQUISITION (OUTPATIENT)
Dept: LAB | Facility: HOSPITAL | Age: 35
End: 2025-05-19
Payer: COMMERCIAL

## 2025-05-19 DIAGNOSIS — Z01.818 ENCOUNTER FOR OTHER PREPROCEDURAL EXAMINATION: ICD-10-CM

## 2025-05-19 LAB
B-HCG SERPL-ACNC: <2 MIU/ML
PROGEST SERPL-MCNC: 10.4 NG/ML

## 2025-05-19 PROCEDURE — 84144 ASSAY OF PROGESTERONE: CPT

## 2025-05-19 PROCEDURE — 84702 CHORIONIC GONADOTROPIN TEST: CPT

## 2025-05-19 NOTE — PROGRESS NOTES
Patient presents to outpatient  [facility] for HCG/progesterone levels.    34yr old patient of Dr. Clay. Patient LMP 4/14/25 and has hx of irregular menses. Assessing HCG/P4 levels for potential Provera start; patient needing to schedule hysteroscopy with CD 1.     Component      Latest Ref Rng 5/19/2025   Progesterone      ng/mL 10.4    HCG, Beta-Quantitative      <5 mIU/mL <2      05/19/25 at 7:46 AM - Joaquina Maciel RN    [facility] PROVIDER NOTE- PROGESTERONE CHECK  Ultrasound and/or labs reviewed at [facility].   Results for orders placed or performed in visit on 05/19/25 (from the past 96 hours)   Progesterone   Result Value Ref Range    Progesterone 10.4 ng/mL   Human Chorionic Gonadotropin, Serum Quantitative   Result Value Ref Range    HCG, Beta-Quantitative <2 <5 mIU/mL       Ovulatory progesterone  yes    Next steps: Call with menses or if no menses within two weeks, check pregnancy test and call with result. Track total cycle length.   Carissa Andersen  05/19/2025  3:47 PM    MyChart sent to patient with result/plan.    05/19/25 at 4:16 PM - Joaquina Maciel RN

## 2025-05-30 ENCOUNTER — HOSPITAL ENCOUNTER (OUTPATIENT)
Dept: ENDOCRINOLOGY | Facility: CLINIC | Age: 35
Discharge: HOME | End: 2025-05-30
Payer: COMMERCIAL

## 2025-05-30 ENCOUNTER — PREP FOR PROCEDURE (OUTPATIENT)
Dept: ENDOCRINOLOGY | Facility: CLINIC | Age: 35
End: 2025-05-30

## 2025-05-30 VITALS
OXYGEN SATURATION: 100 % | RESPIRATION RATE: 16 BRPM | TEMPERATURE: 97.2 F | WEIGHT: 174.6 LBS | BODY MASS INDEX: 35.2 KG/M2 | DIASTOLIC BLOOD PRESSURE: 81 MMHG | SYSTOLIC BLOOD PRESSURE: 144 MMHG | HEIGHT: 59 IN | HEART RATE: 96 BPM

## 2025-05-30 DIAGNOSIS — N84.0 ENDOMETRIAL POLYP: Primary | ICD-10-CM

## 2025-05-30 DIAGNOSIS — Z31.41 FERTILITY TESTING: ICD-10-CM

## 2025-05-30 LAB — PREGNANCY TEST URINE, POC: NEGATIVE

## 2025-05-30 PROCEDURE — 58558 HYSTEROSCOPY BIOPSY: CPT | Performed by: OBSTETRICS & GYNECOLOGY

## 2025-05-30 RX ORDER — IBUPROFEN 200 MG
600 TABLET ORAL EVERY 6 HOURS PRN
Status: DISCONTINUED | OUTPATIENT
Start: 2025-05-30 | End: 2025-05-31 | Stop reason: HOSPADM

## 2025-05-30 RX ORDER — IBUPROFEN 200 MG
600 TABLET ORAL EVERY 6 HOURS PRN
Status: CANCELLED | OUTPATIENT
Start: 2025-05-30

## 2025-05-30 RX ORDER — ACETAMINOPHEN 325 MG/1
650 TABLET ORAL ONCE AS NEEDED
Status: CANCELLED | OUTPATIENT
Start: 2025-05-30

## 2025-05-30 RX ORDER — ACETAMINOPHEN 325 MG/1
650 TABLET ORAL ONCE AS NEEDED
Status: DISCONTINUED | OUTPATIENT
Start: 2025-05-30 | End: 2025-05-31 | Stop reason: HOSPADM

## 2025-05-30 NOTE — PROGRESS NOTES
Patient ID: Yamel Medina is a 34 y.o. female.    Polypectomy    Date/Time: 5/30/2025 3:00 PM    Performed by: Abbi Clay MD  Authorized by: Abbi Clay MD    Consent:     Consent obtained:  Written and verbal    Consent given by:  Patient    Risks, benefits, and alternatives were discussed: yes      Risks discussed:  Bleeding, infection and pain    Alternatives discussed:  No treatment  Universal protocol:     Procedure explained and questions answered to patient or proxy's satisfaction: yes      Relevant documents present and verified: yes      Test results available: yes      Imaging studies available: yes      Immediately prior to procedure, a time out was called: yes      Patient identity confirmed:  Verbally with patient and arm band  Pre-procedure details:     Skin preparation:  Povidone-iodine    Preparation: Patient was prepped and draped in the usual sterile fashion    Procedure specific details:      Preoperative Diagnosis: Fertility testing  Postoperative Diagnosis: Endometrial polyp  Assistant: Ray       Procedure: Hysteroscopic polypectomy    Discussed risks, benefits, alternatives and potential complications of surgical management. Written Informed Consent was obtained prior to the procedure and is detailed in the patient's record. Urine pregnancy test was performed and was negative. Time out was performed.    Anesthesia: None  Paracervical block with 1% lidocaine: Yes  Tenaculum: Yes  Cervical dilation: No  Estimated Blood Loss: 0 cc  Specimens: endometrial polyp  IV Fluids: None  Hysteroscopic fluid deficit: <50  cc     Findings: endometrial polyp(s) noted near left cornua and bilateral ostia well visualized, otherwise normal appearance of cavity.       PROCEDURE DETAILS:   Patient was taken to the operating room.  She was prepped and draped in the usual sterile fashion in dorsal lithotomy position. A speculum was placed within the vaginal canal and the cervix was  visualized. Betadine swabs x 3 were used to cleanse the cervix. A single tooth tenaculum was placed on the anterior lip of the cervix. The cervix was serially dilated.  The Aveta hysteroscope was then inserted into the uterine cavity under direct visualization using normal saline as a distension medium. Once entry into the uterine cavity was completed, the above findings were visualized. The resection device was used to the resect the tissue until the uterus appeared smooth with no residual pathology.   Additional procedure details:     Excellent hemostasis was then noted. The hysteroscope was then removed from the uterine cavity without complications. Tenaculum and speculum were removed. Patient tolerated the procedure well and was returned to the recovery room in stable condition.  All counts were correct x 2. The attending physician was present for the entire procedure.    I was present and supervised the entire procedure.    Abbi Clay 05/30/25 3:02 PM              Post-procedure details:     Procedure completion:  Tolerated well, no immediate complications

## 2025-06-10 LAB
LABORATORY COMMENT REPORT: NORMAL
PATH REPORT.FINAL DX SPEC: NORMAL
PATH REPORT.GROSS SPEC: NORMAL
PATH REPORT.RELEVANT HX SPEC: NORMAL
PATH REPORT.TOTAL CANCER: NORMAL
RESIDENT REVIEW: NORMAL

## 2025-06-13 ENCOUNTER — TELEPHONE (OUTPATIENT)
Dept: ENDOCRINOLOGY | Facility: CLINIC | Age: 35
End: 2025-06-13
Payer: COMMERCIAL

## 2025-06-13 NOTE — TELEPHONE ENCOUNTER
Left voicemail for patient with next steps.   Per [employer] message from [employer], patient still needs to complete labs, FUV, FDA physical, select a sperm donor and consent forms via [employer].     Sahara Cerna 06/13/25 12:03 PM

## 2025-06-18 LAB
ALBUMIN SERPL-MCNC: 4.1 G/DL (ref 3.6–5.1)
ALP SERPL-CCNC: 76 U/L (ref 31–125)
ALT SERPL-CCNC: 13 U/L (ref 6–29)
ANION GAP SERPL CALCULATED.4IONS-SCNC: 9 MMOL/L (CALC) (ref 7–17)
AST SERPL-CCNC: 18 U/L (ref 10–30)
BILIRUB SERPL-MCNC: 0.4 MG/DL (ref 0.2–1.2)
BUN SERPL-MCNC: 10 MG/DL (ref 7–25)
CALCIUM SERPL-MCNC: 9.1 MG/DL (ref 8.6–10.2)
CHLORIDE SERPL-SCNC: 102 MMOL/L (ref 98–110)
CHOLEST SERPL-MCNC: 200 MG/DL
CHOLEST/HDLC SERPL: 3.8 (CALC)
CO2 SERPL-SCNC: 28 MMOL/L (ref 20–32)
CREAT SERPL-MCNC: 0.75 MG/DL (ref 0.5–0.97)
EGFRCR SERPLBLD CKD-EPI 2021: 108 ML/MIN/1.73M2
ERYTHROCYTE [DISTWIDTH] IN BLOOD BY AUTOMATED COUNT: 14.3 % (ref 11–15)
EST. AVERAGE GLUCOSE BLD GHB EST-MCNC: 154 MG/DL
EST. AVERAGE GLUCOSE BLD GHB EST-SCNC: 8.5 MMOL/L
GLUCOSE SERPL-MCNC: 69 MG/DL (ref 65–99)
HBA1C MFR BLD: 7 %
HCT VFR BLD AUTO: 42.4 % (ref 35–45)
HDLC SERPL-MCNC: 53 MG/DL
HGB BLD-MCNC: 13.6 G/DL (ref 11.7–15.5)
LDLC SERPL CALC-MCNC: 126 MG/DL (CALC)
MCH RBC QN AUTO: 28.2 PG (ref 27–33)
MCHC RBC AUTO-ENTMCNC: 32.1 G/DL (ref 32–36)
MCV RBC AUTO: 87.8 FL (ref 80–100)
NONHDLC SERPL-MCNC: 147 MG/DL (CALC)
PLATELET # BLD AUTO: 375 THOUSAND/UL (ref 140–400)
PMV BLD REES-ECKER: 9.7 FL (ref 7.5–12.5)
POTASSIUM SERPL-SCNC: 4.4 MMOL/L (ref 3.5–5.3)
PROT SERPL-MCNC: 6.9 G/DL (ref 6.1–8.1)
RBC # BLD AUTO: 4.83 MILLION/UL (ref 3.8–5.1)
SODIUM SERPL-SCNC: 139 MMOL/L (ref 135–146)
TRIGL SERPL-MCNC: 107 MG/DL
WBC # BLD AUTO: 7.6 THOUSAND/UL (ref 3.8–10.8)

## 2025-06-26 ENCOUNTER — OFFICE VISIT (OUTPATIENT)
Dept: ENDOCRINOLOGY | Facility: CLINIC | Age: 35
End: 2025-06-26
Payer: COMMERCIAL

## 2025-06-26 VITALS
HEART RATE: 76 BPM | DIASTOLIC BLOOD PRESSURE: 78 MMHG | HEIGHT: 60 IN | WEIGHT: 167.3 LBS | SYSTOLIC BLOOD PRESSURE: 126 MMHG | OXYGEN SATURATION: 99 % | BODY MASS INDEX: 32.85 KG/M2 | RESPIRATION RATE: 16 BRPM | TEMPERATURE: 97.7 F

## 2025-06-26 DIAGNOSIS — Z00.5: Primary | ICD-10-CM

## 2025-06-26 PROCEDURE — 3078F DIAST BP <80 MM HG: CPT

## 2025-06-26 PROCEDURE — 99212 OFFICE O/P EST SF 10 MIN: CPT

## 2025-06-26 PROCEDURE — 3008F BODY MASS INDEX DOCD: CPT

## 2025-06-26 PROCEDURE — 1036F TOBACCO NON-USER: CPT

## 2025-06-26 PROCEDURE — 3074F SYST BP LT 130 MM HG: CPT

## 2025-06-26 ASSESSMENT — PAIN SCALES - GENERAL: PAINLEVEL_OUTOF10: 0-NO PAIN

## 2025-06-26 NOTE — PROGRESS NOTES
"  Visit Type: In Person  MD reviewed, Authorization status not noted.    Patient is a 34 yr old female  who presents for an FDA Physical Exam for future IVF using her own oocytes and a possible gestational carrier & Anonymous sperm donor.     Vitals: Temperature 36.5 C//78   Pulse 76   Respirations 16/ Ht 1.6 m (5' 0\")   Wt 98.2 kg (167 lbs)   LMP 2025   BMI 32.67 kg/m2     FDA Physical Exam:   No changes to PMH since last visit 2025  No changes to PSH since last visit 2025     Physical Exam  General: well  Heart: regular rhythm without murmur  Lungs: clear to auscultation x 4 fields  Abdomen: normal  and soft upon palpation with normoactive BS x 4  Pelvic:  EGBUS Normal   Vagina: Normal   Cervix: Normal, cervix pink and no lesions seen  Uterus: Midline, Normal size   Adnexa: Negative     Specfic assessments for oocyte donors:   Is there any evidence of the following:  Comment if any are \"Yes\"   Recent tattoo: No  Recent body piercing: No, ear piercing bilateral when she was 14 yrs old by dermatologist  Poor basic hygiene:  No  Genital lesions: No   Insertion trauma: No   Genital / perianal warts: No   Other physical evidence of sexually transmitted disease: No  Non medical injection sites: No   Home produced tattoo: No  Enlarged lymph nodes: No   Oral thrush: No   Blue purple spots / lesions: No  Trauma / infection: No   Sepsis / rash: No  Jaundice / icterus: No  Enlarged liver: No  Scabs / small pox: No  Eczema vaccinatum:   No  Vaccinia necrosum: No   Vaccinia keratitis: No  Rashes: No  Speculum exam completed to assess for cervicitis, genital ulcerative disease, herpes simplex, genital warts, or chancroid. Evidence of disease : No  Surgery Scar from  cholecystectomy at umbilicus     ASSESSMENT    34 y.o.  female planning future IVF possibly using a Gestational Carrier & Anonymous Sperm donor  Plan:   Unremarkable FDA Physical Exam today. Will reach back to 3rd Party IVF Care " Coordinator for next steps.      Intimate Exam Performed: Patient declined.    Zoie Cervantes CNP 06/26/25 1:15 PM

## 2025-07-10 ENCOUNTER — TELEPHONE (OUTPATIENT)
Dept: ENDOCRINOLOGY | Facility: CLINIC | Age: 35
End: 2025-07-10
Payer: COMMERCIAL

## 2025-07-10 NOTE — TELEPHONE ENCOUNTER
Called patient to do FDA questionnaire for IVF Stim. Also discussed donor sperm and that patient can order the donor she selected and we approved, reminded pt to complete the forms that were sent and she can order 1 vial of sperm or more than 1 if she would like to have back up just in case.  Patient LMP 7/3, patient to call with next menses and start OCPs at that time, will order to local pharmacy.  With menses we will set up dates for stimulation and and schedule baseline and schedule nurse teaching.  Will place referral for financial consult.  Rosanne Rivera, 07/10/2025 & 3:24 PM

## 2025-08-25 ENCOUNTER — SPECIALTY PHARMACY (OUTPATIENT)
Dept: PHARMACY | Facility: CLINIC | Age: 35
End: 2025-08-25

## 2025-08-25 ENCOUNTER — APPOINTMENT (OUTPATIENT)
Dept: ENDOCRINOLOGY | Facility: CLINIC | Age: 35
End: 2025-08-25

## 2025-08-25 ENCOUNTER — TELEPHONE (OUTPATIENT)
Dept: ENDOCRINOLOGY | Facility: CLINIC | Age: 35
End: 2025-08-25
Payer: COMMERCIAL

## 2025-08-25 DIAGNOSIS — N97.9 FEMALE INFERTILITY: ICD-10-CM

## 2025-08-25 DIAGNOSIS — Z31.83 ENCOUNTER FOR ASSISTED REPRODUCTIVE FERTILITY CYCLE: ICD-10-CM

## 2025-08-25 RX ORDER — GANIRELIX ACETATE 250 UG/.5ML
250 INJECTION, SOLUTION SUBCUTANEOUS EVERY MORNING
Qty: 5 EACH | Refills: 2 | Status: SHIPPED | OUTPATIENT
Start: 2025-08-25

## 2025-08-25 RX ORDER — LEUPROLIDE ACETATE 1 MG/0.2ML
4 KIT SUBCUTANEOUS AS NEEDED
Qty: 1 KIT | Refills: 0 | Status: SHIPPED | OUTPATIENT
Start: 2025-08-25

## 2025-09-04 ENCOUNTER — SPECIALTY PHARMACY (OUTPATIENT)
Dept: PHARMACY | Facility: CLINIC | Age: 35
End: 2025-09-04

## 2025-09-05 ENCOUNTER — PHARMACY VISIT (OUTPATIENT)
Dept: PHARMACY | Facility: CLINIC | Age: 35
End: 2025-09-05
Payer: COMMERCIAL

## 2026-03-30 ENCOUNTER — APPOINTMENT (OUTPATIENT)
Dept: OBSTETRICS AND GYNECOLOGY | Facility: CLINIC | Age: 36
End: 2026-03-30
Payer: COMMERCIAL